# Patient Record
Sex: MALE | Employment: OTHER | URBAN - METROPOLITAN AREA
[De-identification: names, ages, dates, MRNs, and addresses within clinical notes are randomized per-mention and may not be internally consistent; named-entity substitution may affect disease eponyms.]

---

## 2022-05-03 ENCOUNTER — OFFICE VISIT (OUTPATIENT)
Dept: PHYSICAL MEDICINE AND REHAB | Facility: CLINIC | Age: 72
End: 2022-05-03
Payer: MEDICARE

## 2022-05-03 ENCOUNTER — LAB (OUTPATIENT)
Dept: LAB | Facility: HOSPITAL | Age: 72
End: 2022-05-03
Payer: MEDICARE

## 2022-05-03 VITALS
HEART RATE: 69 BPM | SYSTOLIC BLOOD PRESSURE: 148 MMHG | OXYGEN SATURATION: 97 % | BODY MASS INDEX: 26.5 KG/M2 | HEIGHT: 62 IN | TEMPERATURE: 98.2 F | DIASTOLIC BLOOD PRESSURE: 75 MMHG | WEIGHT: 144 LBS

## 2022-05-03 DIAGNOSIS — R53.82 CHRONIC FATIGUE: ICD-10-CM

## 2022-05-03 DIAGNOSIS — M54.6 PAIN IN THORACIC SPINE: Primary | ICD-10-CM

## 2022-05-03 DIAGNOSIS — M54.2 NECK PAIN: ICD-10-CM

## 2022-05-03 LAB
ALBUMIN SERPL-MCNC: 3.9 G/DL (ref 3.5–5)
ALP SERPL-CCNC: 79 U/L (ref 45–120)
ALT SERPL W P-5'-P-CCNC: 18 U/L (ref 0–45)
ANION GAP SERPL CALCULATED.3IONS-SCNC: 5 MMOL/L (ref 5–18)
AST SERPL W P-5'-P-CCNC: 20 U/L (ref 0–40)
BASOPHILS # BLD AUTO: 0 10E3/UL (ref 0–0.2)
BASOPHILS NFR BLD AUTO: 1 %
BILIRUB SERPL-MCNC: 0.5 MG/DL (ref 0–1)
BUN SERPL-MCNC: 11 MG/DL (ref 8–28)
CALCIUM SERPL-MCNC: 9.4 MG/DL (ref 8.5–10.5)
CHLORIDE BLD-SCNC: 108 MMOL/L (ref 98–107)
CO2 SERPL-SCNC: 28 MMOL/L (ref 22–31)
CREAT SERPL-MCNC: 0.98 MG/DL (ref 0.7–1.3)
EOSINOPHIL # BLD AUTO: 0.2 10E3/UL (ref 0–0.7)
EOSINOPHIL NFR BLD AUTO: 2 %
ERYTHROCYTE [DISTWIDTH] IN BLOOD BY AUTOMATED COUNT: 12.5 % (ref 10–15)
GFR SERPL CREATININE-BSD FRML MDRD: 82 ML/MIN/1.73M2
GLUCOSE BLD-MCNC: 99 MG/DL (ref 70–125)
HCT VFR BLD AUTO: 41 % (ref 40–53)
HGB BLD-MCNC: 13.4 G/DL (ref 13.3–17.7)
IMM GRANULOCYTES # BLD: 0 10E3/UL
IMM GRANULOCYTES NFR BLD: 0 %
LYMPHOCYTES # BLD AUTO: 1.7 10E3/UL (ref 0.8–5.3)
LYMPHOCYTES NFR BLD AUTO: 21 %
MCH RBC QN AUTO: 28.8 PG (ref 26.5–33)
MCHC RBC AUTO-ENTMCNC: 32.7 G/DL (ref 31.5–36.5)
MCV RBC AUTO: 88 FL (ref 78–100)
MONOCYTES # BLD AUTO: 0.6 10E3/UL (ref 0–1.3)
MONOCYTES NFR BLD AUTO: 7 %
NEUTROPHILS # BLD AUTO: 5.7 10E3/UL (ref 1.6–8.3)
NEUTROPHILS NFR BLD AUTO: 69 %
NRBC # BLD AUTO: 0 10E3/UL
NRBC BLD AUTO-RTO: 0 /100
PLATELET # BLD AUTO: 234 10E3/UL (ref 150–450)
POTASSIUM BLD-SCNC: 3.9 MMOL/L (ref 3.5–5)
PROT SERPL-MCNC: 7 G/DL (ref 6–8)
RBC # BLD AUTO: 4.65 10E6/UL (ref 4.4–5.9)
SODIUM SERPL-SCNC: 141 MMOL/L (ref 136–145)
WBC # BLD AUTO: 8.2 10E3/UL (ref 4–11)

## 2022-05-03 PROCEDURE — 80053 COMPREHEN METABOLIC PANEL: CPT

## 2022-05-03 PROCEDURE — 36415 COLL VENOUS BLD VENIPUNCTURE: CPT

## 2022-05-03 PROCEDURE — 99204 OFFICE O/P NEW MOD 45 MIN: CPT | Performed by: PHYSICAL MEDICINE & REHABILITATION

## 2022-05-03 PROCEDURE — 85025 COMPLETE CBC W/AUTO DIFF WBC: CPT

## 2022-05-03 RX ORDER — ALFUZOSIN HYDROCHLORIDE 10 MG/1
10 TABLET, EXTENDED RELEASE ORAL DAILY
COMMUNITY
Start: 2022-03-24 | End: 2023-03-24

## 2022-05-03 RX ORDER — GABAPENTIN 300 MG/1
1 CAPSULE ORAL AT BEDTIME
COMMUNITY
Start: 2021-10-04

## 2022-05-03 RX ORDER — POTASSIUM CHLORIDE 1500 MG/1
20 TABLET, EXTENDED RELEASE ORAL 2 TIMES DAILY
COMMUNITY
Start: 2021-10-04

## 2022-05-03 RX ORDER — LORAZEPAM 1 MG/1
1 TABLET ORAL 2 TIMES DAILY PRN
COMMUNITY
Start: 2021-10-04 | End: 2023-04-06

## 2022-05-03 RX ORDER — MECLIZINE HYDROCHLORIDE 25 MG/1
25 TABLET ORAL 2 TIMES DAILY PRN
COMMUNITY
Start: 2021-10-04

## 2022-05-03 RX ORDER — FAMOTIDINE 20 MG/1
20 TABLET, FILM COATED ORAL 2 TIMES DAILY
COMMUNITY

## 2022-05-03 RX ORDER — WARFARIN SODIUM 5 MG/1
5 TABLET ORAL DAILY
COMMUNITY
End: 2024-03-06

## 2022-05-03 RX ORDER — ACETAMINOPHEN 500 MG
1000 TABLET ORAL AT BEDTIME
COMMUNITY

## 2022-05-03 RX ORDER — TOPIRAMATE 25 MG/1
25 TABLET, FILM COATED ORAL 2 TIMES DAILY
COMMUNITY
Start: 2021-09-10

## 2022-05-03 RX ORDER — HYDROCHLOROTHIAZIDE 25 MG/1
25 TABLET ORAL DAILY PRN
COMMUNITY
Start: 2021-10-04

## 2022-05-03 RX ORDER — TRIAMTERENE AND HYDROCHLOROTHIAZIDE 75; 50 MG/1; MG/1
0.5 TABLET ORAL
COMMUNITY
Start: 2021-10-04

## 2022-05-03 RX ORDER — ATORVASTATIN CALCIUM 40 MG/1
40 TABLET, FILM COATED ORAL DAILY
COMMUNITY
Start: 2021-10-04

## 2022-05-03 ASSESSMENT — PAIN SCALES - GENERAL: PAINLEVEL: MILD PAIN (3)

## 2022-05-03 NOTE — PROGRESS NOTES
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  The thoracic spine pain does radiate somewhat laterally, but there is no caudal radiation.  Patient is also complaining of bilateral neck pain without any cervical radicular symptoms.  He does have constellation of concerning symptoms including a 15 to 20 pound unintentional weight loss over the last 6 months as well as excessive tiredness/fatigue.  He does have brisk reflexes on exam today but is otherwise without any focal neurologic deficit.  He does have dysphagia and imbalance.  Is difficult to tell if this could be related to the spine or if this could be related to GERD and his Ménière's disease.     He does have chronic bilateral low back pain with sciatica, but he states that this was stable and declined to have this evaluated today, stating that he was mainly there for the neck and the thoracic spine pain.    PSP:  Dr. Yañez    NDI:  22 %      PLAN:  A shared decision making model was used.  The patient's values and choices were respected.  The following represents what was discussed and decided upon by the physician and the patient.  He is accompanied by his daughter, Sahra garcia, for the entire encounter today.      1.  DIAGNOSTIC TESTS/RESULTS REVIEW:    -I did order an MRI of the cervical spine for further work-up today regarding the neck pain and the brisk reflexes as well as a dysphagia and imbalance..  -I did order an MRI of the thoracic spine for further work-up today regarding the thoracic spine pain as well as the unintentional weight loss.  -Both MRIs were ordered for [facility] as the patient does require an open sided MRI.  -I did  order a CBC and a CMP today.  For further work-up regarding his fatigue.  -His CBC from October 4, 2021 results were reviewed and are summarized as follows:  This was essentially normal.  Hemoglobin of 13.5, platelets of 233  -His CMP from October 4, 2021 results were reviewed and are summarized as follows: Very slight elevations of sodium, chloride, creatinine (146, 111, 1.27 respectively) with slightly diminished GFR at 56.  2.  PHYSICAL THERAPY: He may benefit from physical therapy but I would like to see the results of his MRIs first before considering physical therapy.  3.  MEDICATIONS:    -I did recommend that he try taking Tylenol 1000 mg in the morning and 1000 mg in the afternoon in addition to the 1000 mg at bedtime.  -I would recommend holding on a muscle relaxant medication at this time given his significant fatigue and the high side effect of fatigue/tiredness with taking a muscle relaxant medication.  He and his daughter are in agreement.  -The patient takes Coumadin/warfarin so NSAIDs are relatively contraindicated.  4.  INTERVENTIONS: Injections were not discussed today.  He needs further work-up before considering spine interventions.  5.  PATIENT EDUCATION:   -The patient did not have any questions today other than stated that he would like to know what is causing his pain.  I explained that the MRIs will hopefully show us what is causing his pain or at least rule out any concerning pathology as a cause of his pain.  -He and his daughter were in agreement with moving forward with the MRIs and the blood work.  6.  FOLLOW-UP:   I would like to have him follow-up with me after the MRIs to review the results and discuss other treatment options.  If there are any questions/concerns or any significant worsening of pain prior to that time, the patient is asked to call the clinic via the nurse navigation line or via [employer].       SUBJECTIVE:  Bob Norton  Is a 71 year old male who presents today  for new patient evaluation of thoracic spine pain and neck pain, with the thoracic spine pain being the main concern.  This pain has been going on for couple of years, but he does feel like its been getting worse.  He describes the pain is very sharp and states that at times he is unable to take a deep breath and feels like he has trouble breathing because of the pain.  This pain is located right in the middle of the low back.  It can radiate to the right side, but occasionally to the left.  He denies any radiation caudally.  He says that there was one episode last week where he was awakened to a slight stabbing pain that seem to radiate anteriorly.  He said it almost seem to correlate with his heartbeat.  He does have a history of sciatica, but he states that this is not something that is overly concerning to him.  He would like to focus on the thoracic spine pain.  He says that this pain seems to be brought on by working on the computer or preparing meals or medications.  He says he feels like he gets twisted.  At times he feels like there is a weight pushing down on him.  He does feel better if he can sit in a certain chair that has a footstool.  He says that within minutes it can get significantly better.  He does not typically take any pain medications.  At this point he is just taking gabapentin 300 mg at bedtime and Tylenol 1000 mg at bedtime but this is mainly to manage chronic pain from an ulnar neuropathy.  He has not done any physical therapy or had any injections for this pain.    With regards to the neck pain, he points to the base of his neck on either side.  He reports that he has had this for over 10 years.  It does not radiate down to the arms.  She denies any numbness tingling or weakness in the arms.  He says that this pain tends to be worse with driving, especially if there is heavy traffic as it makes him more tense and this seems to aggravate the pain.  He reports that he has tried resting the  area and that this seems to have the pain go away after period of time.  He has seen a chiropractor for several years and it did help temporarily, but he has not seen a chiropractor in over a year.  He has never done physical therapy or had any injections for the neck pain.  He has no history of neck or mid back surgeries.  He did have surgery for his low back back in 1974.  His main concern is finding out what is causing these areas of pain.    Medications:  Reviewed and correct in the chart.      Allergies: Reviewed and Bactrim DS causes a rash, lisinopril causes a cough, metronidazole causes a rash, sulfa causes a rash, venlafaxine causes CNS side effects, Vicodin makes him nauseous.  Anesthesia takes him a long time to awaken from.    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  He did not mention it during the encounter, but his medical condition lists indicate that he has had surgery on his knees.    Family History:  Reviewed and significant for breast cancer in his mother, melanoma and prostate cancer in his father, heart disease in both parents, hypertension in both parents, stroke in his mother.    Social History: The patient is a retired .  He is currently been caring for his ex-wife who has Alzheimer's disease, but his daughter states that she will be moving to a memory care center next week in the [place].  He drinks alcohol about once per month.  She denies any tobacco or illicit drug use    ROS: Positive for 15 pound weight loss since October.  Chronic sexual dysfunction, chronic hoarseness, chronic tinnitus, chronic dysphagia (related to  phlegm), cough related to phlegm, shortness of breath related to pain, reflux, difficulty urinating (related to his prostate), right hand pain secondary to ulnar mononeuropathy, sciatica (this is manageable), imbalance (related to Ménière's disease), excessive tiredness, depression.   Specifically negative for fevers,chills, appetite changes. Otherwise 13 systems reviewed are negative.  Please see the patient's intake questionnaire from today for details.      OBJECTIVE:  PHYSICAL EXAMINATION:  CONSTITUTIONAL:  Vital signs as above.  No acute distress.  The patient is well nourished and well groomed.  PSYCHIATRIC:  The patient is awake, alert, oriented to person, place, time and answering questions appropriately with clear speech.    HEENT:  Sclera are non-injected.  Extraocular muscles are grossly intact. Anterior neck is supple.  Thyroid without obvious mass or obvious irregularity.    SKIN:  Skin over the face, bilateral upper extremities, and posterior torso is clean, dry, intact without rashes.  LYMPH NODES:  No palpable or tender anterior/posterior cervical, submandibular, or supraclavicular lymph nodes.    MUSCLE STRENGTH:  5/5 strength for the bilateral shoulder abductors, elbow flexors/extensors, wrist extensors, finger flexors/abductors.  NEURO:    2+/4 symmetric biceps, brachioradialis, triceps reflexes bilaterally.  2+/4 patellar, medial hamstring, Achilles reflexes bilaterally.  No ankle clonus bilaterally.  Sensation to light touch is impaired in the right ring and pinky fingers compared to these digits on the left hand.  Otherwise sensation light touch is intact in all of the other digits of both upper extremities.  Negative Guerrero's bilaterally.    VASCULAR:  2/4 radial pulses bilaterally.  Warm upper limbs bilaterally.  Capillary refill in the upper extremities is less than 1 second.  MUSCULOSKELETAL: He has normal cervical spine flexion range of motion.  He does report some mild pain with cervical  flexion.  He has significantly restricted extension range of motion of the cervical spine.  He reports no pain though with extension, stating that this actually helps to relieve pain.  He does have severely restricted cervical sidebending bilaterally, but more restricted to the left side compared to the right.  He does not have any pain with bilateral cervical sidebending, but with returning to a neutral position from the right side bending position, he does report pain.  Not as much pain with returning to neutral from the left side bending position.  I does have her severely restricted cervical rotation bilaterally.  No significant pain with this motion.  He does have largely normal range of motion of the shoulders with flexion, extension, abduction.  He does not have any pain with flexion or extension of the shoulders.  With abduction of the shoulders though, he does get some pain along the left side of his neck.  No significant pain with internal or external rotation of the shoulders.  He does have hypertonicity over the bilateral upper trapezius muscles.  There is palpable trigger point in the right upper trapezius that does cause radiation of pain going down his right hand with numbness and tingling in the right hand.  He does not have any significant tenderness over the bilateral cervical paraspinal muscles.  He does report some mild tenderness over the right greater than left thoracic paraspinal muscles at approximately the T8-T10 level.  There is some slight asymmetry over the right thoracolumbar paraspinal muscles and the angles of the right ribs at these levels compared to the left side.  Over the thoracic spine spinous processes, there does appear to be less prominence of the approximately T10-T12 spinous processes compared to the other thoracic and lumbar spinous processes.

## 2022-05-03 NOTE — PATIENT INSTRUCTIONS
Bob,    It was so nice to meet you!  I am ordering an MRI of your cervical spine (neck) and your thoracic spine (mid back).  I have ordered this is an open sided MRI through [facility].  To schedule with [facility] (formerly [facility] and [facility]), please call [phone].    Please follow-up with me (Dr. Yañez) after your MRI to review the results and discuss your treatment options.  Please call [phone] to schedule this appointment.  Please schedule this appointment for at least 3 days after your MRI to allow time for the radiologist to finalize your MRI report.      I did order blood work.  You can have this drawn at [facility] today if you have time to go there after this appointment.  If you prefer to have this done at your primary care physician's office, please call that office to make a blood draw appointment.    You can take Tylenol 1000 mg in the morning and 1000 mg in the afternoon in addition to 1000 mg at bedtime to try and help manage her pain until we get the results of the MRI.    If you have any questions or concerns prior to your appointment, please call the nurse line at [phone].  Or Sahra can reach out to me as well.    Thank you!  Dr. Yañez

## 2022-05-11 ENCOUNTER — ANCILLARY PROCEDURE (OUTPATIENT)
Dept: RADIOLOGY | Facility: CLINIC | Age: 72
End: 2022-05-11
Payer: MEDICARE

## 2022-05-11 ENCOUNTER — TRANSFERRED RECORDS (OUTPATIENT)
Dept: HEALTH INFORMATION MANAGEMENT | Facility: CLINIC | Age: 72
End: 2022-05-11

## 2022-06-02 ENCOUNTER — TELEPHONE (OUTPATIENT)
Dept: PHYSICAL MEDICINE AND REHAB | Facility: CLINIC | Age: 72
End: 2022-06-02
Payer: MEDICARE

## 2022-06-02 NOTE — TELEPHONE ENCOUNTER
PSP:  Dr. Yañez   Last clinic visit: 5/3/22 new consult  Reason for call: Daughter Kimberley calling to discuss imaging  Clinical information:  States patient received a call about whether he was going to schedule his MRIs or not. States he had them done at [facility] 2-3 weeks ago. Per chart review, results are loaded in PACS.   Also states call received about insurance and a bill. Transferred to billing regarding this.   Advice given to patient: Explained PSP would be notified imaging has been completed.     Provider to address: FYI, patient had imaging done 5/11 at [facility]. Images in PACS    Call back to daughter to inform patient should make a follow up appointment for review of imaging and discuss treatment plan. Left detailed message on dedicated voicemail.

## 2022-06-14 NOTE — PROGRESS NOTES
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  He was having concerning symptoms of unintentional weight loss, fatigue, dysphagia, imbalance.  His MRI of the cervical spine does show mild central canal stenosis but no abnormality of the spinal cord.  There are no other findings, such as cancer, that relate to the 15 to 20 pound unintentional weight loss.      He does have a new complaint today of acute right-sided low back pain, consistent with sacroiliac joint pain that has just been bothering him in the interim since he was last seen.    PSP:  Dr. Yañez      PLAN:  A shared decision making model was used.  The patient's values and choices were respected.  The following represents what was discussed and decided upon by the physician and the patient.  His daughter, Sahra garcia, is present for the entire encounter.      1.  DIAGNOSTIC TESTS:    -The patient's comprehensive metabolic panel from May 3, 2022 was reviewed by the physician and is summarized as follows: Slightly elevated chloride at 108 (upper limit of normal is 107) but otherwise this is normal.  -The patient CBC from May 3, 2022 was reviewed by the physician and the results are summarized as follows: This is completely normal.  -The patient's MRI of the cervical spine from May 11, 2022 performed through [facility] was personally reviewed today by the physician with physician performing her own interpretation.  This does show mild C2-3 facet degeneration with more moderate C3-4 facet degeneration.  There is mild central canal stenosis at the C3-4 level with right greater than left severe C3-4 foraminal stenosis.  There is right greater than  left C4-5 facet arthropathy with mild central canal stenosis and mild bilateral C4-5 foraminal stenosis.  There is a right paracentral disc protrusion at the C5-6 level, but no significant central canal stenosis.  There is mild C5-6 facet arthropathy.  There is mild C6-7 facet arthropathy with mild left C6-7 foraminal stenosis.  There is severe C7-T1 foraminal stenosis but no significant central canal stenosis.  Mild C7-T1 facet arthropathy.  -The patient's MRI of the thoracic spine from May 11, 2022 performed through [facility] report was reviewed today by the physician and is summarized as follows: Normal thoracic spinal cord.  No abnormal spinal cord signal change.  There is disc degeneration seen, but no significant central canal stenosis or foraminal stenosis.  There is mild facet arthropathy seen throughout the thoracic spine.  -The image findings from the MRI of the cervical spine and thoracic spine were discussed with the patient.  2.  PHYSICAL THERAPY:   At this point I did offer him physical therapy, as I do not find any contraindication to physical therapy at this time.  He would like to consider this, as he is already supposed to undergo physical therapy for the Parkinson's.  He needs to look at scheduling, and would like to discuss this with his family.  I will check in with him in 1 week to see if he has made a decision about physical therapy.  If he needs more time, I can check in with him in the following week.  - If he does want physical therapy, he prefers to go to [place] (he can go to whichever location he prefers but should check that it is covered by insurance).  The following order can be used:  Diagnosis:  Axial neck pain and midthoracic spine pain likely from myofascial pain or facet etiology.  Acute right low back pain consistent with SI joint pain.   Language:  English  Contraindications:  Patient being worked up for Parkinson's disease - falls precautions  Please teach HEP for core  and neck (glut/hamstring/abdominal/back, midback) strengthening, stretching, ROM.  Can trial cervical traction.  May use modalities as needed but not exceeding 10% of the total treatment time.  Gentle manual medicine/joint mobilization as needed.  Visits:  4-6 times total.      3.  MEDICATIONS:    -Given his fatigue, I am again hesitant to start any sort of prescription medication such as a muscle relaxant or gabapentin, as these could make his fatigue worse.  At this time he states he does not feel that he needs medications because he can sit down to relieve his pain, and seems to be managing well with Tylenol.  His daughter reports that even now he takes the Tylenol very infrequently.  -He can continue to take Tylenol as needed for pain.  -He is taking Coumadin/warfarin so NSAIDs are relatively contraindicated.  4.  INTERVENTIONS:    -Injections were not discussed today as they are typically not indicated for thoracic spine pain.  I think that he would benefit more from the physical therapy first, but if pain does not significantly improve with physical therapy we could look at injections for the neck.  For the neck pain I would potentially start with medial branch blocks at the C5, C6, C7 level.  5.  PATIENT EDUCATION:   -I did discuss that I do not have a good answer for him with regards to the cause of the fatigue, weight loss, dysphagia, imbalance.  I did recommend that he follow-up with his primary care physician.   -He has been referred to neurology for concern regarding Parkinson's disease.  [facility] cannot get him into neurology until January.  I did offer to have him be seen through [facility].  He is amenable to this as long as it is covered by insurance.  It sounds like his daughter will look into this and she did request the referral to [facility].  I will place the referral and will check with neurology manager to see if we can get him in sooner.  6.  FOLLOW-UP:   I will check in  with him through [facility] [facility] in 1 week to see if he has made a decision about physical therapy.  If he decides to move forward with physical therapy, we can check back in with him in 4 to 6 weeks either by appointment or through [facility] [facility].  If he has not made a decision I can follow-up with him the following week.  If he decides that he would like to focus just on the physical therapy for the Parkinson's, I can check in with him in another 2 to 3 weeks through [facility] [facility] to assess his status at that time.  If there are any questions/concerns or any significant worsening of pain prior to that time, the patient is asked to call the clinic via the nurse navigation line or via [employer].     A total of 48 minutes was spent in the care of this patient on the date of service.      Subjective:     Bob Norton  (AKA Pramod) is a 72 year old male who presents today for follow-up regarding neck pain and thoracic spine pain.  Since his initial evaluation, he reports that he is doing somewhat better, but he has been able to avoid a lot of the activities that previously triggered the pain.  His wife has been placed in a living facility, and so he is not doing as much work to care for her and his daughter thinks that this may be some of what is helping with the pain.  He reports though that he does still have the pain if he does any sort of work when he is standing.  He reports that the pain that he does have is just as bad as it was at the initial evaluation.  He just does not experience it as often.  The pain is located at the base of the neck and then he has pain on the right side of his thoracic spine just below the shoulder blade.  He does have some numbness and tingling in the right arm and hand related to previous ulnar neuropathy.  He does feel like he has generalized weakness.  He is rating his pain today at a 3 out of 10.  At worst it is a 7 out of 10.  At best is a 0 out of 10.  His pain is worse with working in a standing  position.  He does feel better almost immediately when he can sit down in a comfortable chair.  Because he can alleviate the symptoms with sitting down, he has not really needed to take the Tylenol very often.  He does feel like he manages fine with Tylenol alone.    He is complaining of a new complaint of right low back pain at the belt line.  It does not radiate down the leg.  It sounds like this has started since he was seen at his initial consultation.  It tends to bother him when he is out doing activities.    He is interested to know the results of his MRIs.    He states that his primary care physician is working him up for Parkinson's disease and he does have a referral to neurology but they cannot see him until January.  He is supposed to do a physical therapy assessment for Parkinson's.    He does state that the dysphagia that he is having has been improved with taking Flonase and using a salt gargle.    He is unsure if he continues to lose weight, as he has not checked his weight recently.  He reports that he is not eating as much, but his appetite seems to be okay.  He reports that he is eating 2 full meals a day and trying to eat 1 partial meal a day in addition to the 2 full meals.    Past medical history is reviewed and is unchanged for any new medical diagnoses in the interim.      The patient clarifies that even though his chart may have listed that he has had previous knee surgeries, he has never had knee surgeries.    Family history is reviewed and is unchanged in the interim.        Review of Systems:  Positive for numbness, tingling, weakness, difficulty with hand skills, unintentional weight loss.  Pertinent negatives include no fevers, chills, bowel incontinence, bladder incontinence, trips, stumbles, falls.  All others reviewed and are negative.     Objective:   CONSTITUTIONAL:  Vital signs as above.  No acute distress.  The patient is well nourished and well groomed.    PSYCHIATRIC:  The  patient is awake, alert, oriented to person, place and time.  The patient is answering questions appropriately with clear speech.  Normal affect.  SKIN:  Skin over the face, neck bilateral upper extremities, posterior torso is clean, dry, intact without rashes.  There are no scars (surgical) over either knee)  MUSCULOSKELETAL: The patient has 5/5 strength for the bilateral shoulder abductors, elbow flexors/extensors, wrist extensors, finger flexors/abductors.  He has 5/5 strength of bilateral hip flexors, knee flexors/extensors, ankle dorsiflexors/plantar flexors, ankle evertors/invertors.  He does have tenderness over the T6 and T7 paraspinal muscles bilaterally.  No tenderness over the more cephalad or caudal thoracic paraspinal muscles.  He points to pain over the right SI joint but there is no significant reproducible tenderness palpation over this area.  No tenderness over the more cephalad lumbar paraspinal muscles.  He has some mild pain over the bilateral cervical paraspinal muscles, at the level of C5-7.  There is mild hypertonicity of the bilateral upper trapezius muscles.  NEUROLOGICAL:  2/4  Biceps, brachioradialis, triceps reflexes bilaterally.  2/4 patellar, medial hamstring, Achilles reflexes bilaterally negative Guerrero's bilaterally.  Sensation to light touch is intact in the bilateral L4, 5, and S1 dermatomes.  No ankle clonus bilaterally.  Sensation light touch is mildly impaired in the right ring and pinky fingers compared to the left ring and pinky fingers.  Sensation to light touch otherwise appears to be intact in the thumb, index finger, middle fingers.

## 2022-06-15 ENCOUNTER — OFFICE VISIT (OUTPATIENT)
Dept: PHYSICAL MEDICINE AND REHAB | Facility: CLINIC | Age: 72
End: 2022-06-15
Payer: MEDICARE

## 2022-06-15 DIAGNOSIS — R53.83 FATIGUE, UNSPECIFIED TYPE: ICD-10-CM

## 2022-06-15 DIAGNOSIS — M54.6 PAIN IN THORACIC SPINE: ICD-10-CM

## 2022-06-15 DIAGNOSIS — M54.2 NECK PAIN: Primary | ICD-10-CM

## 2022-06-15 PROCEDURE — 99215 OFFICE O/P EST HI 40 MIN: CPT | Performed by: PHYSICAL MEDICINE & REHABILITATION

## 2022-06-15 NOTE — LETTER
6/15/2022         RE: [place] [address]        Dear Colleague,    Thank you for referring your patient, Bob Norton, to the [facility]. Please see a copy of my visit note below.    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  He was having concerning symptoms of unintentional weight loss, fatigue, dysphagia, imbalance.  His MRI of the cervical spine does show mild central canal stenosis but no abnormality of the spinal cord.  There are no other findings, such as cancer, that relate to the 15 to 20 pound unintentional weight loss.      He does have a new complaint today of acute right-sided low back pain, consistent with sacroiliac joint pain that has just been bothering him in the interim since he was last seen.    PSP:  Dr. Yañez      PLAN:  A shared decision making model was used.  The patient's values and choices were respected.  The following represents what was discussed and decided upon by the physician and the patient.  His daughter, Sahra garcia, is present for the entire encounter.      1.  DIAGNOSTIC TESTS:    -The patient's comprehensive metabolic panel from May 3, 2022 was reviewed by the physician and is summarized as follows: Slightly elevated chloride at 108 (upper limit of normal is 107) but otherwise this is normal.  -The patient CBC from May 3, 2022 was reviewed by the physician and the results are summarized as follows: This is completely normal.  -The patient's MRI of the cervical spine from May 11, 2022 performed through [facility] was personally reviewed today by the physician with physician performing her  own interpretation.  This does show mild C2-3 facet degeneration with more moderate C3-4 facet degeneration.  There is mild central canal stenosis at the C3-4 level with right greater than left severe C3-4 foraminal stenosis.  There is right greater than left C4-5 facet arthropathy with mild central canal stenosis and mild bilateral C4-5 foraminal stenosis.  There is a right paracentral disc protrusion at the C5-6 level, but no significant central canal stenosis.  There is mild C5-6 facet arthropathy.  There is mild C6-7 facet arthropathy with mild left C6-7 foraminal stenosis.  There is severe C7-T1 foraminal stenosis but no significant central canal stenosis.  Mild C7-T1 facet arthropathy.  -The patient's MRI of the thoracic spine from May 11, 2022 performed through [facility] report was reviewed today by the physician and is summarized as follows: Normal thoracic spinal cord.  No abnormal spinal cord signal change.  There is disc degeneration seen, but no significant central canal stenosis or foraminal stenosis.  There is mild facet arthropathy seen throughout the thoracic spine.  -The image findings from the MRI of the cervical spine and thoracic spine were discussed with the patient.  2.  PHYSICAL THERAPY:   At this point I did offer him physical therapy, as I do not find any contraindication to physical therapy at this time.  He would like to consider this, as he is already supposed to undergo physical therapy for the Parkinson's.  He needs to look at scheduling, and would like to discuss this with his family.  I will check in with him in 1 week to see if he has made a decision about physical therapy.  If he needs more time, I can check in with him in the following week.  - If he does want physical therapy, he prefers to go to [place] (he can go to whichever location he prefers but should check that it is covered by insurance).  The following order can be used:  Diagnosis:  Axial neck pain and  midthoracic spine pain likely from myofascial pain or facet etiology.  Acute right low back pain consistent with SI joint pain.   Language:  English  Contraindications:  Patient being worked up for Parkinson's disease - falls precautions  Please teach HEP for core and neck (glut/hamstring/abdominal/back, midback) strengthening, stretching, ROM.  Can trial cervical traction.  May use modalities as needed but not exceeding 10% of the total treatment time.  Gentle manual medicine/joint mobilization as needed.  Visits:  4-6 times total.      3.  MEDICATIONS:    -Given his fatigue, I am again hesitant to start any sort of prescription medication such as a muscle relaxant or gabapentin, as these could make his fatigue worse.  At this time he states he does not feel that he needs medications because he can sit down to relieve his pain, and seems to be managing well with Tylenol.  His daughter reports that even now he takes the Tylenol very infrequently.  -He can continue to take Tylenol as needed for pain.  -He is taking Coumadin/warfarin so NSAIDs are relatively contraindicated.  4.  INTERVENTIONS:    -Injections were not discussed today as they are typically not indicated for thoracic spine pain.  I think that he would benefit more from the physical therapy first, but if pain does not significantly improve with physical therapy we could look at injections for the neck.  For the neck pain I would potentially start with medial branch blocks at the C5, C6, C7 level.  5.  PATIENT EDUCATION:   -I did discuss that I do not have a good answer for him with regards to the cause of the fatigue, weight loss, dysphagia, imbalance.  I did recommend that he follow-up with his primary care physician.   -He has been referred to neurology for concern regarding Parkinson's disease.  [facility] cannot get him into neurology until January.  I did offer to have him be seen through [facility].  He is amenable to this as long as it  is covered by insurance.  It sounds like his daughter will look into this and she did request the referral to [facility].  I will place the referral and will check with neurology manager to see if we can get him in sooner.  6.  FOLLOW-UP:   I will check in with him through [employer] in 1 week to see if he has made a decision about physical therapy.  If he decides to move forward with physical therapy, we can check back in with him in 4 to 6 weeks either by appointment or through [facility] [facility].  If he has not made a decision I can follow-up with him the following week.  If he decides that he would like to focus just on the physical therapy for the Parkinson's, I can check in with him in another 2 to 3 weeks through [employer] to assess his status at that time.  If there are any questions/concerns or any significant worsening of pain prior to that time, the patient is asked to call the clinic via the nurse navigation line or via [employer].     A total of 48 minutes was spent in the care of this patient on the date of service.      Subjective:     Bob Norton  (AKA Pramod) is a 72 year old male who presents today for follow-up regarding neck pain and thoracic spine pain.  Since his initial evaluation, he reports that he is doing somewhat better, but he has been able to avoid a lot of the activities that previously triggered the pain.  His wife has been placed in a living facility, and so he is not doing as much work to care for her and his daughter thinks that this may be some of what is helping with the pain.  He reports though that he does still have the pain if he does any sort of work when he is standing.  He reports that the pain that he does have is just as bad as it was at the initial evaluation.  He just does not experience it as often.  The pain is located at the base of the neck and then he has pain on the right side of his thoracic spine just below the shoulder blade.  He does have some numbness and tingling  in the right arm and hand related to previous ulnar neuropathy.  He does feel like he has generalized weakness.  He is rating his pain today at a 3 out of 10.  At worst it is a 7 out of 10.  At best is a 0 out of 10.  His pain is worse with working in a standing position.  He does feel better almost immediately when he can sit down in a comfortable chair.  Because he can alleviate the symptoms with sitting down, he has not really needed to take the Tylenol very often.  He does feel like he manages fine with Tylenol alone.    He is complaining of a new complaint of right low back pain at the belt line.  It does not radiate down the leg.  It sounds like this has started since he was seen at his initial consultation.  It tends to bother him when he is out doing activities.    He is interested to know the results of his MRIs.    He states that his primary care physician is working him up for Parkinson's disease and he does have a referral to neurology but they cannot see him until January.  He is supposed to do a physical therapy assessment for Parkinson's.    He does state that the dysphagia that he is having has been improved with taking Flonase and using a salt gargle.    He is unsure if he continues to lose weight, as he has not checked his weight recently.  He reports that he is not eating as much, but his appetite seems to be okay.  He reports that he is eating 2 full meals a day and trying to eat 1 partial meal a day in addition to the 2 full meals.    Past medical history is reviewed and is unchanged for any new medical diagnoses in the interim.      The patient clarifies that even though his chart may have listed that he has had previous knee surgeries, he has never had knee surgeries.    Family history is reviewed and is unchanged in the interim.        Review of Systems:  Positive for numbness, tingling, weakness, difficulty with hand skills, unintentional weight loss.  Pertinent negatives include no fevers,  chills, bowel incontinence, bladder incontinence, trips, stumbles, falls.  All others reviewed and are negative.     Objective:   CONSTITUTIONAL:  Vital signs as above.  No acute distress.  The patient is well nourished and well groomed.    PSYCHIATRIC:  The patient is awake, alert, oriented to person, place and time.  The patient is answering questions appropriately with clear speech.  Normal affect.  SKIN:  Skin over the face, neck bilateral upper extremities, posterior torso is clean, dry, intact without rashes.  There are no scars (surgical) over either knee)  MUSCULOSKELETAL: The patient has 5/5 strength for the bilateral shoulder abductors, elbow flexors/extensors, wrist extensors, finger flexors/abductors.  He has 5/5 strength of bilateral hip flexors, knee flexors/extensors, ankle dorsiflexors/plantar flexors, ankle evertors/invertors.  He does have tenderness over the T6 and T7 paraspinal muscles bilaterally.  No tenderness over the more cephalad or caudal thoracic paraspinal muscles.  He points to pain over the right SI joint but there is no significant reproducible tenderness palpation over this area.  No tenderness over the more cephalad lumbar paraspinal muscles.  He has some mild pain over the bilateral cervical paraspinal muscles, at the level of C5-7.  There is mild hypertonicity of the bilateral upper trapezius muscles.  NEUROLOGICAL:  2/4  Biceps, brachioradialis, triceps reflexes bilaterally.  2/4 patellar, medial hamstring, Achilles reflexes bilaterally negative Guerrero's bilaterally.  Sensation to light touch is intact in the bilateral L4, 5, and S1 dermatomes.  No ankle clonus bilaterally.  Sensation light touch is mildly impaired in the right ring and pinky fingers compared to the left ring and pinky fingers.  Sensation to light touch otherwise appears to be intact in the thumb, index finger, middle fingers.              Again, thank you for allowing me to participate in the care of your  patient.        Sincerely,        Kimberley Covarrubias, DO

## 2022-06-15 NOTE — PATIENT INSTRUCTIONS
Gene,    The MRIs of your neck and your thoracic spine/mid back do not show any abnormalities of the spinal cord.  It does show some mild arthritis/degeneration changes.  I think it is safe for you to undergo physical therapy at this time to try and help with the pain that you are having.  I know that you are undergoing a physical therapy assessment and potentially another physical therapy program for Parkinson's.  If you would prefer to hold off on physical therapy for the neck and the mid back, we can hold off to allow you to focus on the physical therapy for the Parkinson's.  If you do want an order, I would be happy to provide an order.  Please discuss this with your family and I will check back with you in a week to see if you have made a decision, but please note there is no rush in this decision.    I am still concerned about the other symptoms that you are having (Fatigue and weight loss).  I think it would be good if you can follow-up with your primary care physician for some additional work-up.  Your hemoglobin/CBC was normal.  You may benefit from having your thyroid looked at.  The good news was that you did not appear to have diabetes when they last checked her labs in October.    I am going to put in a referral for neurology to see if you can be evaluated sooner than January for the potential Parkinson's.    Please let me know if you have any questions or concerns that, before I check in with you in 1 week.  You are welcome to contact me through [employer].  If you prefer to call the clinic, the nurses direct phone number is [phone].    Thank you, Gene!  Dr. Yañez

## 2022-06-21 ENCOUNTER — TELEPHONE (OUTPATIENT)
Dept: PHYSICAL MEDICINE AND REHAB | Facility: CLINIC | Age: 72
End: 2022-06-21
Payer: MEDICARE

## 2022-06-21 DIAGNOSIS — M53.3 SACROILIAC JOINT PAIN: ICD-10-CM

## 2022-06-21 DIAGNOSIS — M54.6 PAIN IN THORACIC SPINE: ICD-10-CM

## 2022-06-21 DIAGNOSIS — M54.2 NECK PAIN: Primary | ICD-10-CM

## 2022-06-21 NOTE — TELEPHONE ENCOUNTER
PSP:  Dr. Yañez  Last clinic visit:  6/15/2022  Reason for call: PT referral  Clinical information:  Call received from VALERIANO Abarca in [place]. She is currently seeing the pt for his Parkinsons but the patient would like to be seen for his back as well. She inquires about a referral.   Chart reviewed. Pt was offered PT at his last appointment and details of referral were included in OV note.   Referral placed and faxed to Rima at #[phone] as requested.   Advice given to patient: Pt scheduled for PT today so she will start with him today once fax received.   Provider to address: JEANMARIE

## 2022-07-24 ENCOUNTER — HEALTH MAINTENANCE LETTER (OUTPATIENT)
Age: 72
End: 2022-07-24

## 2022-10-03 ENCOUNTER — HEALTH MAINTENANCE LETTER (OUTPATIENT)
Age: 72
End: 2022-10-03

## 2022-10-26 ENCOUNTER — OFFICE VISIT (OUTPATIENT)
Dept: NEUROLOGY | Facility: CLINIC | Age: 72
End: 2022-10-26
Attending: PHYSICAL MEDICINE & REHABILITATION
Payer: MEDICARE

## 2022-10-26 VITALS — SYSTOLIC BLOOD PRESSURE: 103 MMHG | DIASTOLIC BLOOD PRESSURE: 64 MMHG | HEART RATE: 65 BPM

## 2022-10-26 DIAGNOSIS — G20.A1 PARKINSON DISEASE (H): Primary | ICD-10-CM

## 2022-10-26 DIAGNOSIS — R49.0 DYSPHONIA: ICD-10-CM

## 2022-10-26 DIAGNOSIS — R53.83 FATIGUE, UNSPECIFIED TYPE: ICD-10-CM

## 2022-10-26 DIAGNOSIS — K11.7 SIALORRHEA: ICD-10-CM

## 2022-10-26 PROCEDURE — 99205 OFFICE O/P NEW HI 60 MIN: CPT | Performed by: PSYCHIATRY & NEUROLOGY

## 2022-10-26 RX ORDER — CARBIDOPA AND LEVODOPA 25; 100 MG/1; MG/1
TABLET ORAL
Qty: 90 TABLET | Refills: 11 | Status: SHIPPED | OUTPATIENT
Start: 2022-10-26 | End: 2023-03-01

## 2022-10-26 ASSESSMENT — UNIFIED PARKINSONS DISEASE RATING SCALE (UPDRS)
GAIT: NORMAL
TOTAL_SCORE: 8
DYSKINESIAS_PRESENT: NO
HANDMOVEMENTS_RIGHT: SLIGHT: ANY OF THE FOLLOWING: A) THE REGULAR RHYTHM IS BROKEN WITH ONE WITH ONE OR TWO INTERRUPTIONS OR HESITATIONS OF THE MOVEMENT B) SLIGHT SLOWING C) THE AMPLITUDE DECREMENTS NEAR THE END OF THE 10 MOVEMENTS.
FINGER_TAPPING_LEFT: SLIGHT: ANY OF THE FOLLOWING: A) THE REGULAR RHYTHM IS BROKEN WITH ONE WITH ONE OR TWO INTERRUPTIONS OR HESITATIONS OF THE MOVEMENT B) SLIGHT SLOWING C) THE AMPLITUDE DECREMENTS NEAR THE END OF THE 10 MOVEMENTS.
RIGIDITY_NECK: MODERATE: RIGIDITY DETECTED WITHOUT THE ACTIVATION MANEUVER. FULL RANGE OF MOTION IS ACHIEVED WITH EFFORT.
RIGIDITY_LUE: MILD: RIGIDITY DETECTED WITHOUT THE ACTIVATION MANEUVER.  FULL RANGE OF MOTION IS EASILY ACHIEVED.
TOTAL_SCORE: 23
CONSTANCY_TREMOR_ATREST: NORMAL: NO TREMOR.
AMPLITUDE_RUE: SLIGHT: < 1 CM IN MAXIMAL AMPLITUDE.
TOETAPPING_LEFT: NORMAL
RIGIDITY_RUE: SLIGHT: RIGIDITY ONLY DETECTED WITH ACTIVATION MANEUVER.
SPONTANEITY_OF_MOVEMENT: 0: NORMAL.  NO PROBLEMS.
ARISING_CHAIR: NORMAL: ABLE TO ARISE QUICKLY WITHOUT HESITATION.
POSTURE: 1 SLIGHT.  NOT QUITE ERECT BUT COULD BE NORMAL FOR OLDER PERSONS.
PRONATION_SUPINATION_LEFT: MODERATE: ANY OF THE FOLLOWING:  A) MORE THAN 5 INTERRUPTIONS  OR AT LEAST ONE LONGER ARREST (FREEZE) IN ONGOING MOVEMENT  B) MODERATE SLOWING C) THE AMPLITUDE DECREMENTS STARTING AFTER THE FIRST MOVEMENT.
AMPLITUDE_LIP_JAW: NORMAL: NO TREMOR.
FACIAL_EXPRESSION: MODERATE: MASKED FACIES WITH LIPS PARTED SOME OF THE TIME WHEN THE MOUTH IS AT REST.
TOTAL_SCORE_LEFT: 7
AMPLITUDE_LLE: NORMAL: NO TREMOR.
SPEECH: SLIGHT: LOSS OF MODULATION, DICTION OR VOLUME, BUT STILL ALL WORDS EASY TO UNDERSTAND.
LEG_AGILITY_LEFT: NORMAL
RIGIDITY_LLE: NORMAL
HANDMOVEMENTS_LEFT: NORMAL
AMPLITUDE_LUE: NORMAL: NO TREMOR.
PARKINSONS_MEDS: OFF
POSTURAL_STABILITY: NORMAL:  RECOVERS WITH ONE OR TWO STEPS.
LEG_AGILITY_RIGHT: NORMAL
RIGIDITY_RLE: NORMAL
FINGER_TAPPING_RIGHT: MILD: ANY OF THE FOLLOWING: A) 3 TO 5 INTERRUPTIONS DURING TAPPING B) MILD SLOWING C) THE AMPLITUDE DECREMENTS MIDWAY IN THE 10-MOVEMENT SEQUENCE
AMPLITUDE_RLE: NORMAL: NO TREMOR.
AXIAL_SCORE: 8
PRONATION_SUPINATION_RIGHT: SLIGHT: ANY OF THE FOLLOWING: A) THE REGULAR RHYTHM IS BROKEN WITH ONE WITH ONE OR TWO INTERRUPTIONS OR HESITATIONS OF THE MOVEMENT B) SLIGHT SLOWING C) THE AMPLITUDE DECREMENTS NEAR THE END OF THE 10 MOVEMENTS.
TOETAPPING_RIGHT: NORMAL
FREEZING_GAIT: NORMAL

## 2022-10-26 NOTE — PATIENT INSTRUCTIONS
Plan:   - MRI brain images to be provided and scanned to chart  - Start Sinemet (carbidopa/levodopa) 25/100 mg IR as treatment for Parkinson's disease. Use the following schedule to start Sinemet:  Sinemet (CD/LD) 25/100 mg IR AM Noon PM   Week 1                         0.5 tab 0.5 tab 0.5 tab   Week 2             1 1 1    - Take Sinemet one hour before a meal time or one hour after a meal   - May stop at dose that improves symptoms. I would expect your symptoms of bradykinesia (slowness), neck stiffness, tremor, and, to some degree, postural instability or trouble walking  (shuffling). Freezing of gait may or may not respond to this medication.   - Common Side Effects discussed including: dizziness, nausea, constipation. If nausea or vomiting should occur, do not discontinue taking your medication and try taking with crackers or a non-protein snack (cracker, fruit). Also consider taking with ginger-madie (real ginger) to help with nausea.  - For intolerable symptoms, return to a previous dose that was not associated with side effects.    - Iron may interfere with the effectiveness of this medication so do not take iron supplements at the same time you are taking Sinemet.    - [employer] program  - Parkinson Disease resources provided   - Chew gum or have a candy during the day to improve drooling    Patient to return in 3-4 months, for in-person visit, 30 minutes.

## 2022-10-26 NOTE — PROGRESS NOTES
"Department of Neurology  Movement Disorders Division     Patient: Bob Norton   MRN: [id number]   : 1950   Date of Visit: 2022    Dear Colleague,     Thank you for referring your patient, Mr. Norton, to the [facility] NEUROLOGY at [facility]. Please see a copy of my visit note below.    Referring Provider: Kimberley Covarrubias DO    History of Present Illness  Mr. Norton is a 72 year old L handed male that presents to Neurology Movement clinic as a new patient for evaluation of tremors and shuffling.    History obtained from patient. Patient accompanied by daughter, Kimberley.   Patient reports  His symptom started 5 years ago with tremors in hands, right hand first. He had ulnar and nerve surgery and carpal tunnel release. After ulnar nerve surgery had complications and noticed tremors afterward.  Tremors in left hand started 5-6 months ago. Tremors occurs when writing, eating in left hand and dressing himself. Right hand tremors are worse. After stretching, tremors in both hands are prevalent. When stretching neck, hands will shake, R > L.  Symptom progression: Right hand tremor is worse/more intense. When walking slowly, he notice shuffling and when tired he is not as steady on feet. Denies falls. Mouth hands open and he drools often but doesn't have to use a handkerchief/tissues. Volume of speech is decreased and is asked to repeat self x 6 months. He is status post Speech Therapy and Occupational Therapy for tremor with some improvement.       Mr. Bob Norton current PD regimen: none     Previous therapies: none     Parkinson Disease Motor Symptom Review:  Motor fluctuations:     Freezing of gait: In the shower, when standing in one position for 5 minutes, he notices his legs have a harder time moving, R > L.   Dystonia: denies  Tremor: Details above. Denies resting tremor.   Rigidity: no  Bradykinesia: \"I do a lot of things " "slow. I don't have to hurry to do anything.\" He walks slowly due to Meniere's disease in both ears. Hasn't had any episodes from Meniere's in a couple years.  Has ativan prn for exacerbation of Meniere's.   Facial expression: Per daughter, resting face is a little more flat. Per patient, mouth hangs open frequently.   Balance/Falls: Has to be more careful when walking on grass or uneven surfaces. Worries that he will fall if he moves too quickly. Denies foot dragging.   Change in gait: yes, detailed above  Poor sense of smell for \"years\".  Takes topirimate for migraines which exacerbated Meniere's symptoms.   Takes gabapentin for nerve pain in right arm.      Parkinson's Disease Non-motor Symptom Review:  Sleep disturbances - Gets up 1-2 times a night to urinate due to prostate cancer, small. Not eligible for treatment. For the last 5 years, he was sleeping 3-5 hours of sleep as he was taking care of his wife with memory issues. Doesn't uses a CPAP, not for years. Denies active dreaming.   Urinary symptoms - see above. He urinates hourly. Follows with a urologist.  GI symptoms - BM 1-2 times daily.   Psychiatric disturbances - Former wife have just been placed in Memory Care.   Cognitive impairment -  No issues.  Hallucinations - denies  Pain - Pain in right hand form elbow from previous ulnar nerve repair.  Autonomic dysfunction - Intermittently gets dizzy when standing too quickly. Drinks 2 bottles of 18 ounces daily. He avoids drinking too much water as he already urinates frequently.   Speech - Decrease volume in voice.   Swallowing - Yes due to phlegm in throat and started on a saline nebulizer for this. Food gets caught in phlegm and this causes him to cough.  Salivation - detailed above  Dopamine agonist side effects - n/a   History of dopamine depleting therapies - denies  Family history of Parkinsonism: Mom with Essential Tremor.   Patient is oldest of 6 boys.      Additional history:   Driving: no " issues  Living situation: Moved several years ago to [place], [place] and lives on own.   Medication compliance: yes  ADL's: the patient is independent      Review of Systems:  Other than that mentioned above, the remainder of 12 systems reviewed were negative.    History reviewed. No pertinent past medical history.  History reviewed. No pertinent surgical history.  History reviewed. No pertinent family history.  Social History     Socioeconomic History     Marital status:      Spouse name: Not on file     Number of children: Not on file     Years of education: Not on file     Highest education level: Not on file   Occupational History     Not on file   Tobacco Use     Smoking status: Never     Smokeless tobacco: Never   Substance and Sexual Activity     Alcohol use: Not on file     Drug use: Not on file     Sexual activity: Not on file   Other Topics Concern     Not on file   Social History Narrative     Not on file     Social Determinants of Health     Financial Resource Strain: Not on file   Food Insecurity: Not on file   Transportation Needs: Not on file   Physical Activity: Not on file   Stress: Not on file   Social Connections: Not on file   Intimate Partner Violence: Not on file   Housing Stability: Not on file     Medications:  Current Outpatient Medications   Medication Sig Dispense Refill     acetaminophen (TYLENOL) 500 MG tablet Take 500-1,000 mg by mouth At Bedtime       alfuzosin ER (UROXATRAL) 10 MG 24 hr tablet Take 10 mg by mouth daily       atorvastatin (LIPITOR) 40 MG tablet Take 40 mg by mouth daily       carbidopa-levodopa (SINEMET)  MG tablet Week 1 take 0.5 tabs three times daily. Week 2 take 1 tab three times daily. Stop at lowest effective dose. Take medication an hour before or after a protein rich brenda. 90 tablet 11     famotidine (PEPCID) 20 MG tablet Take 20 mg by mouth 2 times daily       gabapentin (NEURONTIN) 300 MG capsule Take 1 capsule by mouth At Bedtime        hydrochlorothiazide (HYDRODIURIL) 25 MG tablet Take 25 mg by mouth daily as needed       LORazepam (ATIVAN) 1 MG tablet Take 1 mg by mouth 2 times daily as needed       meclizine (ANTIVERT) 25 MG tablet Take 25 mg by mouth 2 times daily as needed       potassium chloride ER (K-TAB/KLOR-CON) 10 MEQ CR tablet Take 40 mEq by mouth daily       topiramate (TOPAMAX) 25 MG tablet Take 25 mg by mouth 2 times daily       triamterene-HCTZ (MAXZIDE) 75-50 MG tablet Take 0.5 tablets by mouth       warfarin ANTICOAGULANT (COUMADIN) 5 MG tablet Take 5 mg by mouth daily 5mg//MWF  2.5 mg//STTS             Allergies   Allergen Reactions     Metronidazole Hives and Rash     Other reaction(s): SKIN - rash  Was taking this medication at the same time as sulfameth/trimethoprim, had a reaction when he finished both prescriptions.       Sulfamethoxazole-Trimethoprim Rash and Hives     Was taking this with metronidazole, and when finished with the prescription, broke out in hives.     Codeine Nausea and Vomiting     Halogenated Anesthetics      Other reaction(s): GENERAL - sleep disorders     Hydrocodone-Acetaminophen Nausea and Vomiting and Nausea     Other reaction(s): Intolerance  Vomiting       Lisinopril Cough     Morphine Nausea and Vomiting     Venlafaxine      Other reaction(s): OTHER (explain in comments), UNKNOWN  CNS  CNS           Physical Exam:  The patient's  blood pressure is 103/64 and his pulse is 65.    Physical Exam:  GENERAL: alert, active, attentive, appropriately groomed   HEENT: normocephalic, eyes open with no discharge, nares patent, oropharynx clear-no lesions  CHEST: non labored breathing  EXTREMITIES: no edema/cyanosis in BUE/BLE  PSYCH: mood stable, flat affect    Neurologic Exam:  MENTAL STATUS: Alert and oriented to person, place, time, and situation. Follows commands. Recent and remote memory intact. Attention span and concentration intact. Fund of knowledge intact to current events. Able to spell WORLD  backwards. Able to name an object and describe its function.   SPEECH: Fluent, intact comprehension and articulation  CN: visual fields intact in all fields, EOMIB, no nystagmus, facial sensation intact, facial movement symmetric, hearing grossly intact to conversation, tongue protrudes midline   MOTOR: Moves all extremities equally against gravity without  Involuntary movements:   REFLEXES (R/L): 2/2  in biceps, brachioradialis, patellars  SENSATION: intact to light touch throughout   COORDINATION: no dysmetria with FTN  GAIT: able to rise unassisted from a seated position, decreased bilateral arm swing and normal stride length, no en bloc turns, no ataxia    UPDRS Values 10/26/2022   Medication Off   R Brain DBS: None   L Brain DBS: None   Dyskinesia (LID) No   Speech 1   Facial Expression 3   Rigidity Neck 3   Rigidity RUE 1   Rigidity LUE 2   Rigidity RLE 0   Rigidity LLE 0   Finger Taps R 2   Finger Taps L 1   Hand Mvt R 1   Hand Mvt L 0   Pron-/Supinate R 1   Pron-/Supinate L 3   Toe Tap R 0   Toe Tap L 0   Leg Agility R 0   Leg Agility L 0   Arise From Chair 0   Gait 0   Gait Freezing 0   Postural Stability 0   Posture 1   Global Spont Mvt 0   Postural Tremor RUE 0   Postural Tremor LUE 0   Kinetic Tremor RUE 2   Kinetic Tremor LUE 1   Rest Tremor RUE 1   Rest Tremor LUE 0   Rest Tremor RLE 0   Rest Tremor LLE 0   Rest Tremor Lip/Jaw 0   Rest Tremor Constancy 0   Total Right 8   Total Left 7   Axial Total 8   Total 23       Data Reviewed: I have personally reviewed the tests/studies below.   - MRI of the cervical spine does show mild central canal stenosis but no abnormality of the spinal cord.      Impression:  Bob Norton is a 72 year old male with signs and symptoms consistent with Parkinson Disease. This diagnosis was thoroughly discussed including prognosis and management.     Parkinson Disease: Symptom started 5 years ago (2017) with tremors in hands, right hand. Then about 5-6 months ago tremors  progressed to left hand. Symptoms progressed to hypophonia, shuffling gait, masked facies, drooling.   Sialorrhea   Fatigue     Plan:   - MRI brain images to be provided and scanned to chart  - Start Sinemet (carbidopa/levodopa) 25/100 mg IR as treatment for Parkinson's disease. Use the following schedule to start Sinemet:  Sinemet (CD/LD) 25/100 mg IR AM Noon PM   Week 1                         0.5 tab 0.5 tab 0.5 tab   Week 2             1 1 1    - Take Sinemet one hour before a meal time or one hour after a meal   - May stop at dose that improves symptoms. I would expect your symptoms of bradykinesia (slowness), neck stiffness, tremor, and, to some degree, postural instability or trouble walking  (shuffling). Freezing of gait may or may not respond to this medication.   - Common Side Effects discussed including: dizziness, nausea, constipation. If nausea or vomiting should occur, do not discontinue taking your medication and try taking with crackers or a non-protein snack (cracker, fruit). Also consider taking with ginger-maide (real ginger) to help with nausea.  - For intolerable symptoms, return to a previous dose that was not associated with side effects.    - Iron may interfere with the effectiveness of this medication so do not take iron supplements at the same time you are taking Sinemet.    - [employer] program  - Parkinson Disease resources provided   - Chew gum or have a candy during the day to improve drooling    Patient to return in 3-4 months, for in-person visit, 30 minutes.     Santa Desouza DO, MA   of Neurology   [facility]     67 minutes spent on date of encounter doing chart reviews and exam and documentation and further activities as noted above.

## 2022-10-26 NOTE — NURSING NOTE
Chief Complaint   Patient presents with     Tremors     Referred by SHAWN Sagastume on 10/26/2022 at 10:36 AM

## 2022-10-26 NOTE — LETTER
10/26/2022         RE: Bob Norton  [address]  [place] [address]        Dear Colleague,    Thank you for referring your patient, Bob Norton, to the [facility]. Please see a copy of my visit note below.    Department of Neurology  Movement Disorders Division     Patient: Bob Norton   MRN: [id number]   : 1950   Date of Visit: 2022    Dear Colleague,     Thank you for referring your patient, Mr. Norton, to the [facility] NEUROLOGY at [facility]. Please see a copy of my visit note below.    Referring Provider: Kimberley Covarrubias DO    History of Present Illness  Mr. Norton is a 72 year old L handed male that presents to Neurology Movement clinic as a new patient for evaluation of tremors and shuffling.    History obtained from patient. Patient accompanied by daughter, Kimberley.   Patient reports  His symptom started 5 years ago with tremors in hands, right hand first. He had ulnar and nerve surgery and carpal tunnel release. After ulnar nerve surgery had complications and noticed tremors afterward.  Tremors in left hand started 5-6 months ago. Tremors occurs when writing, eating in left hand and dressing himself. Right hand tremors are worse. After stretching, tremors in both hands are prevalent. When stretching neck, hands will shake, R > L.  Symptom progression: Right hand tremor is worse/more intense. When walking slowly, he notice shuffling and when tired he is not as steady on feet. Denies falls. Mouth hands open and he drools often but doesn't have to use a handkerchief/tissues. Volume of speech is decreased and is asked to repeat self x 6 months. He is status post Speech Therapy and Occupational Therapy for tremor with some improvement.       Mr. Bob Norton current PD regimen: none     Previous therapies: none     Parkinson Disease Motor Symptom Review:  Motor  "fluctuations:     Freezing of gait: In the shower, when standing in one position for 5 minutes, he notices his legs have a harder time moving, R > L.   Dystonia: denies  Tremor: Details above. Denies resting tremor.   Rigidity: no  Bradykinesia: \"I do a lot of things slow. I don't have to hurry to do anything.\" He walks slowly due to Meniere's disease in both ears. Hasn't had any episodes from Meniere's in a couple years.  Has ativan prn for exacerbation of Meniere's.   Facial expression: Per daughter, resting face is a little more flat. Per patient, mouth hangs open frequently.   Balance/Falls: Has to be more careful when walking on grass or uneven surfaces. Worries that he will fall if he moves too quickly. Denies foot dragging.   Change in gait: yes, detailed above  Poor sense of smell for \"years\".  Takes topirimate for migraines which exacerbated Meniere's symptoms.   Takes gabapentin for nerve pain in right arm.      Parkinson's Disease Non-motor Symptom Review:  Sleep disturbances - Gets up 1-2 times a night to urinate due to prostate cancer, small. Not eligible for treatment. For the last 5 years, he was sleeping 3-5 hours of sleep as he was taking care of his wife with memory issues. Doesn't uses a CPAP, not for years. Denies active dreaming.   Urinary symptoms - see above. He urinates hourly. Follows with a urologist.  GI symptoms - BM 1-2 times daily.   Psychiatric disturbances - Former wife have just been placed in Memory Care.   Cognitive impairment -  No issues.  Hallucinations - denies  Pain - Pain in right hand form elbow from previous ulnar nerve repair.  Autonomic dysfunction - Intermittently gets dizzy when standing too quickly. Drinks 2 bottles of 18 ounces daily. He avoids drinking too much water as he already urinates frequently.   Speech - Decrease volume in voice.   Swallowing - Yes due to phlegm in throat and started on a saline nebulizer for this. Food gets caught in phlegm and this causes " him to cough.  Salivation - detailed above  Dopamine agonist side effects - n/a   History of dopamine depleting therapies - denies  Family history of Parkinsonism: Mom with Essential Tremor.   Patient is oldest of 6 boys.      Additional history:   Driving: no issues  Living situation: Moved several years ago to [place], [place] and lives on own.   Medication compliance: yes  ADL's: the patient is independent      Review of Systems:  Other than that mentioned above, the remainder of 12 systems reviewed were negative.    History reviewed. No pertinent past medical history.  History reviewed. No pertinent surgical history.  History reviewed. No pertinent family history.  Social History     Socioeconomic History     Marital status:      Spouse name: Not on file     Number of children: Not on file     Years of education: Not on file     Highest education level: Not on file   Occupational History     Not on file   Tobacco Use     Smoking status: Never     Smokeless tobacco: Never   Substance and Sexual Activity     Alcohol use: Not on file     Drug use: Not on file     Sexual activity: Not on file   Other Topics Concern     Not on file   Social History Narrative     Not on file     Social Determinants of Health     Financial Resource Strain: Not on file   Food Insecurity: Not on file   Transportation Needs: Not on file   Physical Activity: Not on file   Stress: Not on file   Social Connections: Not on file   Intimate Partner Violence: Not on file   Housing Stability: Not on file     Medications:  Current Outpatient Medications   Medication Sig Dispense Refill     acetaminophen (TYLENOL) 500 MG tablet Take 500-1,000 mg by mouth At Bedtime       alfuzosin ER (UROXATRAL) 10 MG 24 hr tablet Take 10 mg by mouth daily       atorvastatin (LIPITOR) 40 MG tablet Take 40 mg by mouth daily       carbidopa-levodopa (SINEMET)  MG tablet Week 1 take 0.5 tabs three times daily. Week 2 take 1 tab three times daily. Stop at  lowest effective dose. Take medication an hour before or after a protein rich brenda. 90 tablet 11     famotidine (PEPCID) 20 MG tablet Take 20 mg by mouth 2 times daily       gabapentin (NEURONTIN) 300 MG capsule Take 1 capsule by mouth At Bedtime       hydrochlorothiazide (HYDRODIURIL) 25 MG tablet Take 25 mg by mouth daily as needed       LORazepam (ATIVAN) 1 MG tablet Take 1 mg by mouth 2 times daily as needed       meclizine (ANTIVERT) 25 MG tablet Take 25 mg by mouth 2 times daily as needed       potassium chloride ER (K-TAB/KLOR-CON) 10 MEQ CR tablet Take 40 mEq by mouth daily       topiramate (TOPAMAX) 25 MG tablet Take 25 mg by mouth 2 times daily       triamterene-HCTZ (MAXZIDE) 75-50 MG tablet Take 0.5 tablets by mouth       warfarin ANTICOAGULANT (COUMADIN) 5 MG tablet Take 5 mg by mouth daily 5mg//MWF  2.5 mg//STTS             Allergies   Allergen Reactions     Metronidazole Hives and Rash     Other reaction(s): SKIN - rash  Was taking this medication at the same time as sulfameth/trimethoprim, had a reaction when he finished both prescriptions.       Sulfamethoxazole-Trimethoprim Rash and Hives     Was taking this with metronidazole, and when finished with the prescription, broke out in hives.     Codeine Nausea and Vomiting     Halogenated Anesthetics      Other reaction(s): GENERAL - sleep disorders     Hydrocodone-Acetaminophen Nausea and Vomiting and Nausea     Other reaction(s): Intolerance  Vomiting       Lisinopril Cough     Morphine Nausea and Vomiting     Venlafaxine      Other reaction(s): OTHER (explain in comments), UNKNOWN  CNS  CNS           Physical Exam:  The patient's  blood pressure is 103/64 and his pulse is 65.    Physical Exam:  GENERAL: alert, active, attentive, appropriately groomed   HEENT: normocephalic, eyes open with no discharge, nares patent, oropharynx clear-no lesions  CHEST: non labored breathing  EXTREMITIES: no edema/cyanosis in BUE/BLE  PSYCH: mood stable, flat  affect    Neurologic Exam:  MENTAL STATUS: Alert and oriented to person, place, time, and situation. Follows commands. Recent and remote memory intact. Attention span and concentration intact. Fund of knowledge intact to current events. Able to spell WORLD backwards. Able to name an object and describe its function.   SPEECH: Fluent, intact comprehension and articulation  CN: visual fields intact in all fields, EOMIB, no nystagmus, facial sensation intact, facial movement symmetric, hearing grossly intact to conversation, tongue protrudes midline   MOTOR: Moves all extremities equally against gravity without  Involuntary movements:   REFLEXES (R/L): 2/2  in biceps, brachioradialis, patellars  SENSATION: intact to light touch throughout   COORDINATION: no dysmetria with FTN  GAIT: able to rise unassisted from a seated position, decreased bilateral arm swing and normal stride length, no en bloc turns, no ataxia    UPDRS Values 10/26/2022   Medication Off   R Brain DBS: None   L Brain DBS: None   Dyskinesia (LID) No   Speech 1   Facial Expression 3   Rigidity Neck 3   Rigidity RUE 1   Rigidity LUE 2   Rigidity RLE 0   Rigidity LLE 0   Finger Taps R 2   Finger Taps L 1   Hand Mvt R 1   Hand Mvt L 0   Pron-/Supinate R 1   Pron-/Supinate L 3   Toe Tap R 0   Toe Tap L 0   Leg Agility R 0   Leg Agility L 0   Arise From Chair 0   Gait 0   Gait Freezing 0   Postural Stability 0   Posture 1   Global Spont Mvt 0   Postural Tremor RUE 0   Postural Tremor LUE 0   Kinetic Tremor RUE 2   Kinetic Tremor LUE 1   Rest Tremor RUE 1   Rest Tremor LUE 0   Rest Tremor RLE 0   Rest Tremor LLE 0   Rest Tremor Lip/Jaw 0   Rest Tremor Constancy 0   Total Right 8   Total Left 7   Axial Total 8   Total 23       Data Reviewed: I have personally reviewed the tests/studies below.   - MRI of the cervical spine does show mild central canal stenosis but no abnormality of the spinal cord.      Impression:  Bob Norton is a 72 year old male with  signs and symptoms consistent with Parkinson Disease. This diagnosis was thoroughly discussed including prognosis and management.     Parkinson Disease: Symptom started 5 years ago (2017) with tremors in hands, right hand. Then about 5-6 months ago tremors progressed to left hand. Symptoms progressed to hypophonia, shuffling gait, masked facies, drooling.   Sialorrhea   Fatigue     Plan:   - MRI brain images to be provided and scanned to chart  - Start Sinemet (carbidopa/levodopa) 25/100 mg IR as treatment for Parkinson's disease. Use the following schedule to start Sinemet:  Sinemet (CD/LD) 25/100 mg IR AM Noon PM   Week 1                         0.5 tab 0.5 tab 0.5 tab   Week 2             1 1 1    - Take Sinemet one hour before a meal time or one hour after a meal   - May stop at dose that improves symptoms. I would expect your symptoms of bradykinesia (slowness), neck stiffness, tremor, and, to some degree, postural instability or trouble walking  (shuffling). Freezing of gait may or may not respond to this medication.   - Common Side Effects discussed including: dizziness, nausea, constipation. If nausea or vomiting should occur, do not discontinue taking your medication and try taking with crackers or a non-protein snack (cracker, fruit). Also consider taking with ginger-madie (real ginger) to help with nausea.  - For intolerable symptoms, return to a previous dose that was not associated with side effects.    - Iron may interfere with the effectiveness of this medication so do not take iron supplements at the same time you are taking Sinemet.    - [employer] program  - Parkinson Disease resources provided   - Chew gum or have a candy during the day to improve drooling    Patient to return in 3-4 months, for in-person visit, 30 minutes.     Santa Desouza DO MA   of Neurology   [facility]     67 minutes spent on date of encounter doing chart reviews and exam and  documentation and further activities as noted above.                Again, thank you for allowing me to participate in the care of your patient.        Sincerely,        Santa Desouza, DO

## 2022-11-04 ENCOUNTER — TRANSFERRED RECORDS (OUTPATIENT)
Dept: HEALTH INFORMATION MANAGEMENT | Facility: CLINIC | Age: 72
End: 2022-11-04

## 2022-11-10 ENCOUNTER — DOCUMENTATION ONLY (OUTPATIENT)
Dept: NEUROLOGY | Facility: CLINIC | Age: 72
End: 2022-11-10

## 2022-11-10 NOTE — PROGRESS NOTES
Received order from [facility], order was signed by provider and fax to [phone], and sent to be scanned

## 2023-03-01 ENCOUNTER — OFFICE VISIT (OUTPATIENT)
Dept: NEUROLOGY | Facility: CLINIC | Age: 73
End: 2023-03-01
Payer: MEDICARE

## 2023-03-01 VITALS — SYSTOLIC BLOOD PRESSURE: 116 MMHG | HEART RATE: 65 BPM | DIASTOLIC BLOOD PRESSURE: 66 MMHG

## 2023-03-01 DIAGNOSIS — G20.A1 PARKINSON DISEASE (H): Primary | ICD-10-CM

## 2023-03-01 DIAGNOSIS — F32.A DEPRESSION, UNSPECIFIED DEPRESSION TYPE: ICD-10-CM

## 2023-03-01 DIAGNOSIS — R49.0 DYSPHONIA: ICD-10-CM

## 2023-03-01 PROCEDURE — 99214 OFFICE O/P EST MOD 30 MIN: CPT | Performed by: PSYCHIATRY & NEUROLOGY

## 2023-03-01 RX ORDER — CITALOPRAM HYDROBROMIDE 20 MG/1
20 TABLET ORAL DAILY
COMMUNITY
Start: 2023-02-24

## 2023-03-01 RX ORDER — LORAZEPAM 0.5 MG/1
TABLET ORAL
COMMUNITY
Start: 2023-02-24

## 2023-03-01 RX ORDER — CARBIDOPA AND LEVODOPA 25; 100 MG/1; MG/1
1.5 TABLET ORAL 3 TIMES DAILY
Qty: 405 TABLET | Refills: 3 | Status: SHIPPED | OUTPATIENT
Start: 2023-03-01 | End: 2024-03-18

## 2023-03-01 RX ORDER — WARFARIN SODIUM 5 MG/1
TABLET ORAL
COMMUNITY
Start: 2021-12-27

## 2023-03-01 RX ORDER — TOPIRAMATE 25 MG/1
1 TABLET, FILM COATED ORAL 2 TIMES DAILY
COMMUNITY
Start: 2022-09-23 | End: 2024-03-06

## 2023-03-01 ASSESSMENT — UNIFIED PARKINSONS DISEASE RATING SCALE (UPDRS)
DYSKINESIAS_PRESENT: NO
FINGER_TAPPING_LEFT: (1) SLIGHT: ANY OF THE FOLLOWING: A) THE REGULAR RHYTHM IS BROKEN WITH ONE WITH ONE OR TWO INTERRUPTIONS OR HESITATIONS OF THE MOVEMENT  B) SLIGHT SLOWING  C) THE AMPLITUDE DECREMENTS NEAR THE END OF THE 10 MOVEMENTS.
PRONATION_SUPINATION_RIGHT: (0) NORMAL: NO PROBLEMS.
SPEECH: (1) SLIGHT: LOSS OF MODULATION, DICTION OR VOLUME, BUT STILL ALL WORDS EASY TO UNDERSTAND.
RIGIDITY_RLE: (0) NORMAL: NO RIGIDITY.
CONSTANCY_TREMOR_ATREST: (0) NORMAL: NO TREMOR.
TOETAPPING_RIGHT: (0) NORMAL: NO PROBLEMS.
GAIT: (0) NORMAL: NO PROBLEMS.
FINGER_TAPPING_RIGHT: (0) NORMAL: NO PROBLEMS.
SPONTANEITY_OF_MOVEMENT: (1) SLIGHT: SLIGHT GLOBAL SLOWNESS AND POVERTY OF SPONTANEOUS MOVEMENTS.
LEG_AGILITY_RIGHT: (0) NORMAL: NO PROBLEMS.
AMPLITUDE_LUE: (0) NORMAL: NO TREMOR.
AMPLITUDE_LIP_JAW: (0) NORMAL: NO TREMOR.
FACIAL_EXPRESSION: (1) SLIGHT: MINIMAL MASKED FACIES MANIFESTED ONLY BY DECREASED FREQUENCY OF BLINKING.
LEG_AGILITY_LEFT: (0) NORMAL: NO PROBLEMS.
AMPLITUDE_RUE: (0) NORMAL: NO TREMOR.
POSTURE: (0) NORMAL: NO PROBLEMS.
ARISING_CHAIR: (0) NORMAL: NO PROBLEMS. ABLE TO ARISE QUICKLY WITHOUT HESITATION.
AMPLITUDE_RLE: (0) NORMAL: NO TREMOR.
AMPLITUDE_LLE: (0) NORMAL: NO TREMOR.
RIGIDITY_RUE: (1) SLIGHT: RIGIDITY ONLY DETECTED WITH ACTIVATION MANEUVER.
HANDMOVEMENTS_RIGHT: (0) NORMAL: NO PROBLEMS.
FREEZING_GAIT: (0) NORMAL: NO FREEZING.
TOETAPPING_LEFT: (0) NORMAL: NO PROBLEMS.
TOTAL_SCORE_LEFT: 6
HANDMOVEMENTS_LEFT: (1) SLIGHT: ANY OF THE FOLLOWING: A) THE REGULAR RHYTHM IS BROKEN WITH ONE WITH ONE OR TWO INTERRUPTIONS OR HESITATIONS OF THE MOVEMENT  B) SLIGHT SLOWING  C) THE AMPLITUDE DECREMENTS NEAR THE END OF THE 10 MOVEMENTS.
RIGIDITY_LLE: (0) NORMAL: NO RIGIDITY.
RIGIDITY_NECK: (1) SLIGHT: RIGIDITY ONLY DETECTED WITH ACTIVATION MANEUVER.
RIGIDITY_LUE: (2) MILD: RIGIDITY DETECTED WITHOUT THE ACTIVATION MANEUVER. FULL RANGE OF MOTION IS EASILY ACHIEVED.
PRONATION_SUPINATION_LEFT: (1) SLIGHT: ANY OF THE FOLLOWING: A) THE REGULAR RHYTHM IS BROKEN WITH ONE WITH ONE OR TWO INTERRUPTIONS OR HESITATIONS OF THE MOVEMENT  B) SLIGHT SLOWING  C) THE AMPLITUDE DECREMENTS NEAR THE END OF THE 10 MOVEMENTS.
PARKINSONS_MEDS: ON
TOTAL_SCORE: 4

## 2023-03-01 NOTE — PATIENT INSTRUCTIONS
Plan:   - Increase dose of carbidopa/levodopa to 1.5 tabs three times daily   - Follow with therapy  - Continue management of mood with Dr. Stewart.   - Referral with Psychiatry     Patient to return in 3 months, for in-person visit, 30 minutes.

## 2023-03-01 NOTE — LETTER
"    3/1/2023         RE: Bob Norton  [address]  [facility] [address]        Dear Colleague,    Thank you for referring your patient, Bob Norton, to the [facility] NEUROLOGY CLINIC [facility]. Please see a copy of my visit note below.    Department of Neurology  Movement Disorders Division     Patient: Bob Norton   MRN: [id number]   : 1950   Date of Visit: 2023    History of Present Illness  Mr. Norton is a 72 year old male that presents to Neurology Movement clinic for follow up regarding Parkinson Disease management.     History obtained from patient. Patient is accompanied by daughter, Kimberley.  Patient reports that walking is improved and is walking upright. Feels teeth chattering. Left hand is worse and has difficulty with writing. Has occasional good days and is able to read his handwriting. Left hand is tingling. He is able to get up out of chair much easier. Goal is to write checks and work on computer. Able to rotate head in both directions with more ease.   Carbidopa/levodopa dose: denies dizziness, nausea, vomiting. Avoids protein with carbidopa/levodopa.   Tremors: No improvement with current dose of carbidopa/levodopa. Tremors improve later in the day at times.   Drooling: no issues  Fatigue: Has good and bad days.  Has been doing Speech Therapy and feels that it improves volume when speaking.   2 weeks ago he was in a 72 hour hold as he \"couldn't take it anymore.\" Support system were all out of town. Currently taking citalopram 10 mg and ativan 0.5 mg. Citalopram just incrased while in the hospital.   Movement Disorder-related Medications                   8 am 2 pm 6-7 pm     Carbidopa/levodopa IR 25/100 mg  1 1 1                             Last taken at 1130 pm  Above schedule fluctuates based on the day.     Review of Systems:  Other than that mentioned above, the remainder of 12 systems reviewed were negative.    PMH: unchanged  PSH: " unchanged  FH: unchanged  SH: unchanged    Medications:  Current Outpatient Medications   Medication Sig Dispense Refill     acetaminophen (TYLENOL) 500 MG tablet Take 500-1,000 mg by mouth At Bedtime       alfuzosin ER (UROXATRAL) 10 MG 24 hr tablet Take 10 mg by mouth daily       atorvastatin (LIPITOR) 40 MG tablet Take 40 mg by mouth daily       carbidopa-levodopa (SINEMET)  MG tablet Week 1 take 0.5 tabs three times daily. Week 2 take 1 tab three times daily. Stop at lowest effective dose. Take medication an hour before or after a protein rich brenda. 90 tablet 11     citalopram (CELEXA) 10 MG tablet        famotidine (PEPCID) 20 MG tablet Take 20 mg by mouth 2 times daily       gabapentin (NEURONTIN) 300 MG capsule Take 1 capsule by mouth At Bedtime       hydrochlorothiazide (HYDRODIURIL) 25 MG tablet Take 25 mg by mouth daily as needed       LORazepam (ATIVAN) 0.5 MG tablet        LORazepam (ATIVAN) 1 MG tablet Take 1 mg by mouth 2 times daily as needed       meclizine (ANTIVERT) 25 MG tablet Take 25 mg by mouth 2 times daily as needed       potassium chloride ER (K-TAB/KLOR-CON) 10 MEQ CR tablet Take 40 mEq by mouth daily       pyridOXINE (VITAMIN B6) 100 MG TABS Take 100 mg by mouth daily       topiramate (TOPAMAX) 25 MG tablet Take 25 mg by mouth 2 times daily       triamterene-HCTZ (MAXZIDE) 75-50 MG tablet Take 0.5 tablets by mouth       vitamin B-12 (CYANOCOBALAMIN) 1000 MCG tablet Take 1 tablet by mouth daily       warfarin ANTICOAGULANT (COUMADIN) 5 MG tablet Take by mouth 1 tab (5mg) on Mon, Wed, Fri and 0.5 tab (2.5mg) all other days OR as directed by INR nurse.       topiramate (TOPAMAX) 25 MG tablet Take 1 tablet by mouth 2 times daily (Patient not taking: Reported on 3/1/2023)       warfarin ANTICOAGULANT (COUMADIN) 5 MG tablet Take 5 mg by mouth daily 5mg//MWF  2.5 mg//STTS (Patient not taking: Reported on 3/1/2023)             Allergies   Allergen Reactions     Metronidazole Hives and Rash      Other reaction(s): SKIN - rash  Was taking this medication at the same time as sulfameth/trimethoprim, had a reaction when he finished both prescriptions.       Sulfamethoxazole-Trimethoprim Rash and Hives     Was taking this with metronidazole, and when finished with the prescription, broke out in hives.     Codeine Nausea and Vomiting     Halogenated Anesthetics      Other reaction(s): GENERAL - sleep disorders     Hydrocodone-Acetaminophen Nausea and Vomiting and Nausea     Other reaction(s): Intolerance  Vomiting       Lisinopril Cough     Morphine Nausea and Vomiting     Venlafaxine      Other reaction(s): OTHER (explain in comments), UNKNOWN  CNS  CNS            Physical Exam:  The patient's  blood pressure is 116/66 and his pulse is 65.    Bilateral hearing aids   UPDRS Motor Scale 3/1/2023   Time: 2:02 PM   Medication On   R Brain DBS: None   L Brain DBS: None   Dyskinesia (LID) No   Speech 1   Facial Expression 1   Rigidity Neck 1   Rigidity RUE 1   Rigidity LUE 2   Rigidity RLE 0   Rigidity LLE 0   Finger Taps R 0   Finger Taps L 1   Hand Mvt R 0   Hand Mvt L 1   Pron-/Supinate R 0   Pron-/Supinate L 1   Toe Tap R 0   Toe Tap L 0   Leg Agility R 0   Leg Agility L 0   Arise From Chair 0   Gait 0   Gait Freezing 0   Postural Stability    Posture 0   Global Spont Mvt 1   Postural Tremor RUE 1   Postural Tremor LUE 0   Kinetic Tremor RUE 2   Kinetic Tremor LUE 1   Rest Tremor RUE 0   Rest Tremor LUE 0   Rest Tremor RLE 0   Rest Tremor LLE 0   Rest Tremor Lip/Jaw 0   Rest Tremor Constancy 0   Total Right 4   Total Left 6   Axial Total    Total        Impression:  Bob Norton is a 72 year old male with Parkinson Disease. The patient's main concern today is left hand movements, tremors, bradykinesia, poor dexterity. He would like to improve his left hand symptoms to write. Despite the above, he does observe improvement is other Parkinson Disease symptoms with starting carbidopa/levodopa such as walking,  getting up from a seated position, stiffness in head/neck with improvement in rotating head and neck.     Parkinson Disease: Symptom started 5 years ago (2017) with tremors in hands, right hand. Then about 5-6 months ago tremors progressed to left hand. Symptoms progressed to hypophonia, shuffling gait, masked facies, drooling.  Depression: Recently placed on a 48 hour hold. While inpatient, medication adjustments were made to improve mood.   Fatigue     Plan:   - Increase dose of carbidopa/levodopa to 1.5 tabs three times daily   - Follow with therapy  - Continue management of mood with Dr. Stewart.   - Referral to Psychiatry for mood management      Patient to return in 3 months, for in-person visit, 30 minutes.     Santa Desouza DO, MA   of Neurology   [facility]    30 minutes spent on date of encounter doing chart reviews and exam and documentation and further activities as noted above.                      Again, thank you for allowing me to participate in the care of your patient.        Sincerely,        Santa Desouza DO

## 2023-03-01 NOTE — PROGRESS NOTES
"Department of Neurology  Movement Disorders Division     Patient: Bob Norton   MRN: [id number]   : 1950   Date of Visit: 2023    History of Present Illness  Mr. Norton is a 72 year old male that presents to Neurology Movement clinic for follow up regarding Parkinson Disease management.     History obtained from patient. Patient is accompanied by daughter, Kimberley.  Patient reports that walking is improved and is walking upright. Feels teeth chattering. Left hand is worse and has difficulty with writing. Has occasional good days and is able to read his handwriting. Left hand is tingling. He is able to get up out of chair much easier. Goal is to write checks and work on computer. Able to rotate head in both directions with more ease.   Carbidopa/levodopa dose: denies dizziness, nausea, vomiting. Avoids protein with carbidopa/levodopa.   Tremors: No improvement with current dose of carbidopa/levodopa. Tremors improve later in the day at times.   Drooling: no issues  Fatigue: Has good and bad days.  Has been doing Speech Therapy and feels that it improves volume when speaking.   2 weeks ago he was in a 72 hour hold as he \"couldn't take it anymore.\" Support system were all out of town. Currently taking citalopram 10 mg and ativan 0.5 mg. Citalopram just incrased while in the hospital.   Movement Disorder-related Medications                   8 am 2 pm 6-7 pm     Carbidopa/levodopa IR 25/100 mg  1 1 1                             Last taken at 1130 pm  Above schedule fluctuates based on the day.     Review of Systems:  Other than that mentioned above, the remainder of 12 systems reviewed were negative.    PMH: unchanged  PSH: unchanged  FH: unchanged  SH: unchanged    Medications:  Current Outpatient Medications   Medication Sig Dispense Refill     acetaminophen (TYLENOL) 500 MG tablet Take 500-1,000 mg by mouth At Bedtime       alfuzosin ER (UROXATRAL) 10 MG 24 hr tablet Take 10 mg by mouth " daily       atorvastatin (LIPITOR) 40 MG tablet Take 40 mg by mouth daily       carbidopa-levodopa (SINEMET)  MG tablet Week 1 take 0.5 tabs three times daily. Week 2 take 1 tab three times daily. Stop at lowest effective dose. Take medication an hour before or after a protein rich brenda. 90 tablet 11     citalopram (CELEXA) 10 MG tablet        famotidine (PEPCID) 20 MG tablet Take 20 mg by mouth 2 times daily       gabapentin (NEURONTIN) 300 MG capsule Take 1 capsule by mouth At Bedtime       hydrochlorothiazide (HYDRODIURIL) 25 MG tablet Take 25 mg by mouth daily as needed       LORazepam (ATIVAN) 0.5 MG tablet        LORazepam (ATIVAN) 1 MG tablet Take 1 mg by mouth 2 times daily as needed       meclizine (ANTIVERT) 25 MG tablet Take 25 mg by mouth 2 times daily as needed       potassium chloride ER (K-TAB/KLOR-CON) 10 MEQ CR tablet Take 40 mEq by mouth daily       pyridOXINE (VITAMIN B6) 100 MG TABS Take 100 mg by mouth daily       topiramate (TOPAMAX) 25 MG tablet Take 25 mg by mouth 2 times daily       triamterene-HCTZ (MAXZIDE) 75-50 MG tablet Take 0.5 tablets by mouth       vitamin B-12 (CYANOCOBALAMIN) 1000 MCG tablet Take 1 tablet by mouth daily       warfarin ANTICOAGULANT (COUMADIN) 5 MG tablet Take by mouth 1 tab (5mg) on Mon, Wed, Fri and 0.5 tab (2.5mg) all other days OR as directed by INR nurse.       topiramate (TOPAMAX) 25 MG tablet Take 1 tablet by mouth 2 times daily (Patient not taking: Reported on 3/1/2023)       warfarin ANTICOAGULANT (COUMADIN) 5 MG tablet Take 5 mg by mouth daily 5mg//MWF  2.5 mg//STTS (Patient not taking: Reported on 3/1/2023)             Allergies   Allergen Reactions     Metronidazole Hives and Rash     Other reaction(s): SKIN - rash  Was taking this medication at the same time as sulfameth/trimethoprim, had a reaction when he finished both prescriptions.       Sulfamethoxazole-Trimethoprim Rash and Hives     Was taking this with metronidazole, and when finished with  the prescription, broke out in hives.     Codeine Nausea and Vomiting     Halogenated Anesthetics      Other reaction(s): GENERAL - sleep disorders     Hydrocodone-Acetaminophen Nausea and Vomiting and Nausea     Other reaction(s): Intolerance  Vomiting       Lisinopril Cough     Morphine Nausea and Vomiting     Venlafaxine      Other reaction(s): OTHER (explain in comments), UNKNOWN  CNS  CNS            Physical Exam:  The patient's  blood pressure is 116/66 and his pulse is 65.    Bilateral hearing aids   UPDRS Motor Scale 3/1/2023   Time: 2:02 PM   Medication On   R Brain DBS: None   L Brain DBS: None   Dyskinesia (LID) No   Speech 1   Facial Expression 1   Rigidity Neck 1   Rigidity RUE 1   Rigidity LUE 2   Rigidity RLE 0   Rigidity LLE 0   Finger Taps R 0   Finger Taps L 1   Hand Mvt R 0   Hand Mvt L 1   Pron-/Supinate R 0   Pron-/Supinate L 1   Toe Tap R 0   Toe Tap L 0   Leg Agility R 0   Leg Agility L 0   Arise From Chair 0   Gait 0   Gait Freezing 0   Postural Stability    Posture 0   Global Spont Mvt 1   Postural Tremor RUE 1   Postural Tremor LUE 0   Kinetic Tremor RUE 2   Kinetic Tremor LUE 1   Rest Tremor RUE 0   Rest Tremor LUE 0   Rest Tremor RLE 0   Rest Tremor LLE 0   Rest Tremor Lip/Jaw 0   Rest Tremor Constancy 0   Total Right 4   Total Left 6   Axial Total    Total        Impression:  Bob Norton is a 72 year old male with Parkinson Disease. The patient's main concern today is left hand movements, tremors, bradykinesia, poor dexterity. He would like to improve his left hand symptoms to write. Despite the above, he does observe improvement is other Parkinson Disease symptoms with starting carbidopa/levodopa such as walking, getting up from a seated position, stiffness in head/neck with improvement in rotating head and neck.     Parkinson Disease: Symptom started 5 years ago (2017) with tremors in hands, right hand. Then about 5-6 months ago tremors progressed to left hand. Symptoms  progressed to hypophonia, shuffling gait, masked facies, drooling.  Depression: Recently placed on a 48 hour hold. While inpatient, medication adjustments were made to improve mood.   Fatigue     Plan:   - Increase dose of carbidopa/levodopa to 1.5 tabs three times daily   - Follow with therapy  - Continue management of mood with Dr. Stewart.   - Referral to Psychiatry for mood management      Patient to return in 3 months, for in-person visit, 30 minutes.     Santa Desouza DO, MA   of Neurology   [facility]    30 minutes spent on date of encounter doing chart reviews and exam and documentation and further activities as noted above.

## 2023-03-07 ENCOUNTER — TRANSFERRED RECORDS (OUTPATIENT)
Dept: HEALTH INFORMATION MANAGEMENT | Facility: CLINIC | Age: 73
End: 2023-03-07

## 2023-03-15 ENCOUNTER — DOCUMENTATION ONLY (OUTPATIENT)
Dept: OTHER | Facility: CLINIC | Age: 73
End: 2023-03-15
Payer: MEDICARE

## 2023-04-06 ENCOUNTER — OFFICE VISIT (OUTPATIENT)
Dept: NEUROLOGY | Facility: CLINIC | Age: 73
End: 2023-04-06
Payer: MEDICARE

## 2023-04-06 VITALS — DIASTOLIC BLOOD PRESSURE: 66 MMHG | HEART RATE: 72 BPM | SYSTOLIC BLOOD PRESSURE: 109 MMHG

## 2023-04-06 DIAGNOSIS — F32.A DEPRESSION, UNSPECIFIED DEPRESSION TYPE: ICD-10-CM

## 2023-04-06 PROCEDURE — 99204 OFFICE O/P NEW MOD 45 MIN: CPT | Performed by: PSYCHIATRY & NEUROLOGY

## 2023-04-06 NOTE — PATIENT INSTRUCTIONS
The patient presented for a second opinion and I agree with the recent assessment and treatment plan.  I have made no medication changes and will see the patient back on a as needed basis in the future.  The patient will continue to follow-up with neurology as per their recommendation and his primary care provider.

## 2023-04-06 NOTE — PROGRESS NOTES
"Initial clinic intake note:    The patient was seen for an intake to this clinic on April 6, 2022.  The patient has a prior history of depression in the context of his wife's declining health as she has Alzheimer's disease.  He is being followed by Dr. Stewart for primary care and does not want to move from that clinic as he likes it there.  He is being followed for Parkinson's disease through our neurology clinic.  He states Dr. Stewart diagnosed him and has been treating him for depression and anxiety.  In January she started him on low-dose citalopram as well as as needed Ativan and that was helpful.  He and his wife are  and they do have other family members that are concerned about her declining cognitive ability.  The patient wanted to move her to [place] where he could be closer to her but the family has stepped in and felt that was not in her best interest.  The patient became upset and in February \"turned himself in\" to an emergency room in [facility] where he was admitted for a couple of days.  They apparently continued the medications.  The patient had previously been power of  but he no longer is and he believes that precipitated his decline.  He denies having any current thoughts of hurting himself or anybody else and admits that since his return home after his hospitalization he has had no such thoughts.  He denies having any significant premorbid history.  He has never had a suicide attempt.  Even during the recent crisis he had no plan and did not think he would follow through with it.  No symptoms of khadar.  No psychosis.  As stated above, he likes his current primary care doctor but was sent here by our neurology clinic just to have additional input on the current treatment plan.    HPI:  The patient presents at this time with history as described above.  Currently he reports he is doing better.  He is frustrated by his current circumstances as described above.  He has no access to " firearms and is able to contract for his safety.  He states he feels guilty because his wife is living so far away from him and he feels as if he let her down.  They were  a number of years ago but he states they recently had their marriage blessed.  The patient does understand that perhaps it is best for his wife to stay in [place] at her current placement and she has children there that are supporting the patient but he does not feel they are spending enough time with her so he feels guilty.  The patient denies having any hallucinations or delusions.  No new medical issues or concerns.  No side effects to the medication.  The patient has been set up with psychology support and states he has been to a couple of visits and that is going well.  He is tolerating the medication and there is been no new medical issues or concerns.    On my interview with the patient today he denies having any suicidality.  No psychosis and no khadar.  He is sleeping better with use of the Ativan at night and I did talk with him about long-term use of that medic patient and the potential negative effects.  At this time it seems to be helpful.  He has recently started up with a therapist and states he will follow through with that.  The patient is medically stable and being followed as described above.  At this time I did spend quite a bit of time talking with him about differential diagnoses and some of the psychosocial stressors he is under but I agree with the current treatment plan.      Allergies   Allergen Reactions     Metronidazole Hives and Rash       Other reaction(s): SKIN - rash  Was taking this medication at the same time as sulfameth/trimethoprim, had a reaction when he finished both prescriptions.        Sulfamethoxazole-Trimethoprim Rash and Hives       Was taking this with metronidazole, and when finished with the prescription, broke out in hives.     Codeine Nausea and Vomiting     Halogenated Anesthetics          Other reaction(s): GENERAL - sleep disorders     Hydrocodone-Acetaminophen Nausea and Vomiting and Nausea       Other reaction(s): Intolerance  Vomiting        Lisinopril Cough     Morphine Nausea and Vomiting     Venlafaxine         Other reaction(s): OTHER (explain in comments), UNKNOWN  CNS  CNS         Past medical history:    Please see the chart for full medical details.  Recent, relevant records are as described below.    A recent neurology referral note is as follows:  Impression:  Bob Norton is a 72 year old male with Parkinson Disease. The patient's main concern today is left hand movements, tremors, bradykinesia, poor dexterity. He would like to improve his left hand symptoms to write. Despite the above, he does observe improvement is other Parkinson Disease symptoms with starting carbidopa/levodopa such as walking, getting up from a seated position, stiffness in head/neck with improvement in rotating head and neck.      Parkinson Disease: Symptom started 5 years ago (2017) with tremors in hands, right hand. Then about 5-6 months ago tremors progressed to left hand. Symptoms progressed to hypophonia, shuffling gait, masked facies, drooling.  Depression: Recently placed on a 48 hour hold. While inpatient, medication adjustments were made to improve mood.   Fatigue      Plan:   - Increase dose of carbidopa/levodopa to 1.5 tabs three times daily   - Follow with therapy  - Continue management of mood with Dr. Stewart.   - Referral to Psychiatry for mood management     A recent physical therapy note is as follows:   ASSESSMENT AND SUMMARY: Pramod is a 72 year old male who initially presented to physical therapy with bradykinesia, hypokinesia, dyskinesia, fatigue, and bilateral UE tremor (right greater than left). Pramod felt at the time that these symptoms were minimally impacting his daily life. However, he did indicate that the weakness in his hands along with the tremor do affect his ability to use his  hands like he would prefer. He completed the 5 Time Sit to Stand Test in 8.09 seconds, which is an improvement in his time from June of 2022, which was 16.63 seconds. He also completed the 3-Step TUG within norms for community living adults, and with slightly improved times compared to when assessed in June of 2022. He scored a 22/30 on the Functional Gait Assessment which indicated a slight increased falls risk within the next year. His past medical history is significant for Meniere's disease and Lupus, both of which could impact his symptoms from Parkinson's Disease. He was self-limiting his recreational activities (ie fly fishing) due to his UE tremor and other PD symptoms. He shared concerns on 11/05/2022 about being able to continue to live on his own; but his ABC Scale showed 98.44% confidence in his safety with mobility. He attended one additional visit in November to work on creating a home program that he could complete until the new year (2023) when he was ready to participate in [facility]. He presented for a follow up on 03/07/2023 and for his first [facility] session. Upon this visit, his 5 Time Sit to Stand and TUG tests were within 0.5-1 second of their time on 11/04/2022. He did show improvement in his FGA score, with a 25/30, indicating less of a falls risk at this time. He voices noticing improved movement and posture with walking and transfers since being on the carbidopa-levodopa medication. However, he feels that his tremors in his B hands are still very limiting. He continues to present at this time with bradykinesia, hypokinesia, dyskinesia, fatigue, and bilateral UE tremor (right greater than left). Gene will benefit from therapy services to maintain normal amplitude of movements, slow progression of the disease, and maintain his independence. He will benefit from participation in the [facility] Program to ensure improved balance and confidence with mobility, increased gait speed, and overall  "improvement in his functional mobility for improved quality of life and maintained independence. His goals for therapy are to improve his posture, exercise more and be more active, and to be able to dress independently and with ease.    03/15/2023: Gene tolerated therapy well today and was able to complete all maximal daily exercises with 25% cueing. He did have a fall this morning which caused some \"discombobulation\" today. He is demonstrating improved amplitude and speed of movement with all functional component and hierarchy tasks. Ongoing therapy services are beneficial to progress towards improved balance and improved amplitude of movement in all daily tasks.        Patient to return in 3 months, for in-person visit, 30 minutes.  The patient reports there has been no new medical issues or concerns and I refer the reader to his prior medical records for additional information.    Past psychiatric history:  Patient denies having any premorbid psychiatric diagnoses or treatments prior to the recent diagnosis of depression by his primary care physician.  The only psychotropic medication he has ever been on is citalopram at 20 mg a day which the patient is reporting has been somewhat helpful.  He is also has some as needed Ativan which has helped him with sleep.  He had a psychiatric hospitalization last month as described above.  He was determined stable and was discharged to home and referred to this clinic by his neurologist to assess the current psychiatric plan.    The patient has never had any hallucinations or delusions.  No history of any khadar.  No history of any actual suicide attempts and no prior history of any suicidality.  He has never had any OCD symptoms.    Chemical dependency history:  The patient does not use alcohol or other substances.  He denies he has ever had any chemical dependency issues.  He has never been to detox and has never had a DWI.    Family history:  He denies having any family " history of mental illness or chemical dependency issues.     Social history:  The patient was born and raised in a [place] town in [facility] [place] [facility].  He states he was the oldest of 5 children and he had only brothers.  He stated he felt a lot of pressure growing up by his father.  No history of any learning disabilities or behavioral problems when he was young.  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  He has been  twice.  He was  5 years ago in January.  As a Shinto he believes he is still  and states that last year he and his wife went to a [employer]  to bless their marriage.  He is seeking to have more control over his ex-wife's affairs as she has Alzheimer's disease and has been struggling for about 12 to 15 years with declining cognitive abilities.  She is apparently in a facility in [place].  He has no children with her but he has 3 daughters from a prior relationship.  He currently lives alone.    Review of systems:  Patient admits admits recent lack of appetite.  He was having trouble with sleep but that has improved.  He denies having any new neurologic symptoms.  He is being followed by neurology.  No other new medical issues or concerns.    Mental status exam:  The patient was seen in person.  He was slow but there was no obvious tremor.  No evidence of any pain or shortness of breath.  Speech: Soft spoken and monotone.  He did have a hearing aid.  Answers were consistent and appropriate.  Not pressured or rambling.  Thought content: No evidence of any hallucinations or delusions.  Affect:.  Somewhat flat and depressed.  No khadar or lability.  No irritability.  Thought process: Patient was slow but able to track and follow.  Not disorganized.  No racing thoughts.  Thought formation was fair but slow  Memory: Recent memory was fair.  He did need occasional prompts.  Further testing may be considered.  Remote  memory appeared grossly intact.  Insight: Fair  Judgment: Fair  Orientation: Grossly intact    Diagnoses:  Major depression, single episode, moderate to severe, without psychosis  Parkinson's disease  Generalized anxiety    Assessment:  The patient presents with history as described above with the recent emergency room visit for significant depression.  He was not actually suicidal but that leading thoughts of wishing he was dead.  That has improved.  There is no current suicidality.  No evidence of any psychosis or khadar.  The patient was sent here having recently been started by his primary care clinic on Celexa.  He believes that is been helpful.  He also has as needed Ativan.  He is here for a second opinion regarding the current treatment plan and I did spend some time talking with him about that including differential diagnoses and other treatment options.    Plan:  The patient has established with a psychologist which hopefully will be quite helpful for the patient.  He is working with his family on better communication with them regarding his wife's condition and the treatment plan.  I spent a lot of time talking with patient about how I felt it was in his wife's best interest that she continue with her current treatment plan and providers in [place] and he could try and offer support.  He would like to have additional access to communicate with his wife but he does not have the legal power to do that and we spent some time talking about that.    I will make no psychotropic medication changes at this time.  The patient will follow up to this clinic on a as needed basis.  I discussed with him the possibility of having his current provider further increase the Celexa or change antidepressants if necessary.  The patient will return to this clinic on an as needed basis.  He agrees to call with any questions or concerns.

## 2023-04-06 NOTE — LETTER
"    4/6/2023         RE: Bob Bor[address] [place]  [facility] [address]        Dear Colleague,    Thank you for referring your patient, Bob Norton, to the [facility]. Please see a copy of my visit note below.    Initial clinic intake note:    The patient was seen for an intake to this clinic on April 6, 2022.  The patient has a prior history of depression in the context of his wife's declining health as she has Alzheimer's disease.  He is being followed by Dr. Stewart for primary care and does not want to move from that clinic as he likes it there.  He is being followed for Parkinson's disease through our neurology clinic.  He states Dr. Stewart diagnosed him and has been treating him for depression and anxiety.  In January she started him on low-dose citalopram as well as as needed Ativan and that was helpful.  He and his wife are  and they do have other family members that are concerned about her declining cognitive ability.  The patient wanted to move her to [place] where he could be closer to her but the family has stepped in and felt that was not in her best interest.  The patient became upset and in February \"turned himself in\" to an emergency room in [facility] where he was admitted for a couple of days.  They apparently continued the medications.  The patient had previously been power of  but he no longer is and he believes that precipitated his decline.  He denies having any current thoughts of hurting himself or anybody else and admits that since his return home after his hospitalization he has had no such thoughts.  He denies having any significant premorbid history.  He has never had a suicide attempt.  Even during the recent crisis he had no plan and did not think he would follow through with it.  No symptoms of khadar.  No psychosis.  As stated above, he likes his current primary care doctor but was sent here by our neurology " clinic just to have additional input on the current treatment plan.    HPI:  The patient presents at this time with history as described above.  Currently he reports he is doing better.  He is frustrated by his current circumstances as described above.  He has no access to firearms and is able to contract for his safety.  He states he feels guilty because his wife is living so far away from him and he feels as if he let her down.  They were  a number of years ago but he states they recently had their marriage blessed.  The patient does understand that perhaps it is best for his wife to stay in [place] at her current placement and she has children there that are supporting the patient but he does not feel they are spending enough time with her so he feels guilty.  The patient denies having any hallucinations or delusions.  No new medical issues or concerns.  No side effects to the medication.  The patient has been set up with psychology support and states he has been to a couple of visits and that is going well.  He is tolerating the medication and there is been no new medical issues or concerns.    On my interview with the patient today he denies having any suicidality.  No psychosis and no khadar.  He is sleeping better with use of the Ativan at night and I did talk with him about long-term use of that medic patient and the potential negative effects.  At this time it seems to be helpful.  He has recently started up with a therapist and states he will follow through with that.  The patient is medically stable and being followed as described above.  At this time I did spend quite a bit of time talking with him about differential diagnoses and some of the psychosocial stressors he is under but I agree with the current treatment plan.      Allergies   Allergen Reactions     Metronidazole Hives and Rash       Other reaction(s): SKIN - rash  Was taking this medication at the same time as sulfameth/trimethoprim,  had a reaction when he finished both prescriptions.        Sulfamethoxazole-Trimethoprim Rash and Hives       Was taking this with metronidazole, and when finished with the prescription, broke out in hives.     Codeine Nausea and Vomiting     Halogenated Anesthetics         Other reaction(s): GENERAL - sleep disorders     Hydrocodone-Acetaminophen Nausea and Vomiting and Nausea       Other reaction(s): Intolerance  Vomiting        Lisinopril Cough     Morphine Nausea and Vomiting     Venlafaxine         Other reaction(s): OTHER (explain in comments), UNKNOWN  CNS  CNS         Past medical history:    Please see the chart for full medical details.  Recent, relevant records are as described below.    A recent neurology referral note is as follows:  Impression:  Bob Norton is a 72 year old male with Parkinson Disease. The patient's main concern today is left hand movements, tremors, bradykinesia, poor dexterity. He would like to improve his left hand symptoms to write. Despite the above, he does observe improvement is other Parkinson Disease symptoms with starting carbidopa/levodopa such as walking, getting up from a seated position, stiffness in head/neck with improvement in rotating head and neck.      Parkinson Disease: Symptom started 5 years ago (2017) with tremors in hands, right hand. Then about 5-6 months ago tremors progressed to left hand. Symptoms progressed to hypophonia, shuffling gait, masked facies, drooling.  Depression: Recently placed on a 48 hour hold. While inpatient, medication adjustments were made to improve mood.   Fatigue      Plan:   - Increase dose of carbidopa/levodopa to 1.5 tabs three times daily   - Follow with therapy  - Continue management of mood with Dr. Stewart.   - Referral to Psychiatry for mood management     A recent physical therapy note is as follows:   ASSESSMENT AND SUMMARY: Pramod is a 72 year old male who initially presented to physical therapy with bradykinesia,  hypokinesia, dyskinesia, fatigue, and bilateral UE tremor (right greater than left). Pramod felt at the time that these symptoms were minimally impacting his daily life. However, he did indicate that the weakness in his hands along with the tremor do affect his ability to use his hands like he would prefer. He completed the 5 Time Sit to Stand Test in 8.09 seconds, which is an improvement in his time from June of 2022, which was 16.63 seconds. He also completed the 3-Step TUG within norms for community living adults, and with slightly improved times compared to when assessed in June of 2022. He scored a 22/30 on the Functional Gait Assessment which indicated a slight increased falls risk within the next year. His past medical history is significant for Meniere's disease and Lupus, both of which could impact his symptoms from Parkinson's Disease. He was self-limiting his recreational activities (ie fly fishing) due to his UE tremor and other PD symptoms. He shared concerns on 11/05/2022 about being able to continue to live on his own; but his ABC Scale showed 98.44% confidence in his safety with mobility. He attended one additional visit in November to work on creating a home program that he could complete until the new year (2023) when he was ready to participate in [facility]. He presented for a follow up on 03/07/2023 and for his first [facility] session. Upon this visit, his 5 Time Sit to Stand and TUG tests were within 0.5-1 second of their time on 11/04/2022. He did show improvement in his FGA score, with a 25/30, indicating less of a falls risk at this time. He voices noticing improved movement and posture with walking and transfers since being on the carbidopa-levodopa medication. However, he feels that his tremors in his B hands are still very limiting. He continues to present at this time with bradykinesia, hypokinesia, dyskinesia, fatigue, and bilateral UE tremor (right greater than left). Pramod will benefit from  "therapy services to maintain normal amplitude of movements, slow progression of the disease, and maintain his independence. He will benefit from participation in the [facility] Program to ensure improved balance and confidence with mobility, increased gait speed, and overall improvement in his functional mobility for improved quality of life and maintained independence. His goals for therapy are to improve his posture, exercise more and be more active, and to be able to dress independently and with ease.    03/15/2023: Gene tolerated therapy well today and was able to complete all maximal daily exercises with 25% cueing. He did have a fall this morning which caused some \"discombobulation\" today. He is demonstrating improved amplitude and speed of movement with all functional component and hierarchy tasks. Ongoing therapy services are beneficial to progress towards improved balance and improved amplitude of movement in all daily tasks.        Patient to return in 3 months, for in-person visit, 30 minutes.  The patient reports there has been no new medical issues or concerns and I refer the reader to his prior medical records for additional information.    Past psychiatric history:  Patient denies having any premorbid psychiatric diagnoses or treatments prior to the recent diagnosis of depression by his primary care physician.  The only psychotropic medication he has ever been on is citalopram at 20 mg a day which the patient is reporting has been somewhat helpful.  He is also has some as needed Ativan which has helped him with sleep.  He had a psychiatric hospitalization last month as described above.  He was determined stable and was discharged to home and referred to this clinic by his neurologist to assess the current psychiatric plan.    The patient has never had any hallucinations or delusions.  No history of any khadar.  No history of any actual suicide attempts and no prior history of any suicidality.  He has " never had any OCD symptoms.    Chemical dependency history:  The patient does not use alcohol or other substances.  He denies he has ever had any chemical dependency issues.  He has never been to detox and has never had a DWI.    Family history:  He denies having any family history of mental illness or chemical dependency issues.     Social history:  The patient was born and [place] in [place] town in [facility].  He states he was the oldest of 5 children and he had only brothers.  He stated he felt a lot of pressure growing up by his father.  No history of any learning disabilities or behavioral problems when he was young.  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  He has been  twice.  He was  5 years ago in January.  As a Yazidism he believes he is still  and states that last year he and his wife went to a [place]  to bless their marriage.  He is seeking to have more control over his ex-wife's affairs as she has Alzheimer's disease and has been struggling for about 12 to 15 years with declining cognitive abilities.  She is apparently in a facility in [place].  He has no children with her but he has 3 daughters from a prior relationship.  He currently lives alone.    Review of systems:  Patient admits admits recent lack of appetite.  He was having trouble with sleep but that has improved.  He denies having any new neurologic symptoms.  He is being followed by neurology.  No other new medical issues or concerns.    Mental status exam:  The patient was seen in person.  He was slow but there was no obvious tremor.  No evidence of any pain or shortness of breath.  Speech: Soft spoken and monotone.  He did have a hearing aid.  Answers were consistent and appropriate.  Not pressured or rambling.  Thought content: No evidence of any hallucinations or delusions.  Affect:.  Somewhat flat and depressed.  No khadar or  lability.  No irritability.  Thought process: Patient was slow but able to track and follow.  Not disorganized.  No racing thoughts.  Thought formation was fair but slow  Memory: Recent memory was fair.  He did need occasional prompts.  Further testing may be considered.  Remote memory appeared grossly intact.  Insight: Fair  Judgment: Fair  Orientation: Grossly intact    Diagnoses:  Major depression, single episode, moderate to severe, without psychosis  Parkinson's disease  Generalized anxiety    Assessment:  The patient presents with history as described above with the recent emergency room visit for significant depression.  He was not actually suicidal but that leading thoughts of wishing he was dead.  That has improved.  There is no current suicidality.  No evidence of any psychosis or khadar.  The patient was sent here having recently been started by his primary care clinic on Celexa.  He believes that is been helpful.  He also has as needed Ativan.  He is here for a second opinion regarding the current treatment plan and I did spend some time talking with him about that including differential diagnoses and other treatment options.    Plan:  The patient has established with a psychologist which hopefully will be quite helpful for the patient.  He is working with his family on better communication with them regarding his wife's condition and the treatment plan.  I spent a lot of time talking with patient about how I felt it was in his wife's best interest that she continue with her current treatment plan and providers in [place] and he could try and offer support.  He would like to have additional access to communicate with his wife but he does not have the legal power to do that and we spent some time talking about that.    I will make no psychotropic medication changes at this time.  The patient will follow up to this clinic on a as needed basis.  I discussed with him the possibility of having his current  provider further increase the Celexa or change antidepressants if necessary.  The patient will return to this clinic on an as needed basis.  He agrees to call with any questions or concerns.      Again, thank you for allowing me to participate in the care of your patient.        Sincerely,        Amauri Rocha MD

## 2023-04-06 NOTE — NURSING NOTE
Chief Complaint   Patient presents with     Depression     Referred by SHAWN Leiva on 4/6/2023 at 10:54 AM

## 2023-08-13 ENCOUNTER — HEALTH MAINTENANCE LETTER (OUTPATIENT)
Age: 73
End: 2023-08-13

## 2023-09-13 ENCOUNTER — OFFICE VISIT (OUTPATIENT)
Dept: NEUROLOGY | Facility: CLINIC | Age: 73
End: 2023-09-13
Payer: MEDICARE

## 2023-09-13 VITALS — HEART RATE: 58 BPM | DIASTOLIC BLOOD PRESSURE: 63 MMHG | SYSTOLIC BLOOD PRESSURE: 120 MMHG

## 2023-09-13 DIAGNOSIS — G20.A1 PARKINSON DISEASE (H): Primary | ICD-10-CM

## 2023-09-13 DIAGNOSIS — G47.10 HYPERSOMNOLENCE: ICD-10-CM

## 2023-09-13 DIAGNOSIS — K11.7 SIALORRHEA: ICD-10-CM

## 2023-09-13 PROCEDURE — 99215 OFFICE O/P EST HI 40 MIN: CPT | Mod: GC | Performed by: PSYCHIATRY & NEUROLOGY

## 2023-09-13 NOTE — PROGRESS NOTES
"Department of Neurology  Movement Disorders Division      Patient: Bob Norton   MRN: [id number]   : 1950   Date of Visit: 2023     History of Present Illness  Mr. Norton is a 72 year old male that presents to Neurology Movement clinic for follow up regarding Parkinson Disease management. Patient was last seen in clinic 3/1/2023 - plan was to increase sinemet.     History obtained from patient. Patient is accompanied by daughter, Kimberley (ER physician).    After increasing Sinemet, he has noticed significant improvements: his handwriting and gait have improved. He is able to walk much more fluid and upright. His speech has also improved, speaking louder. Now is able to eat soup again without spilling.    Patient feels that after increasing the sinemet, he has had increased somnolence. He now sleeps at 9-10PM and wakes up at 9AM (averaging 10-12 hours of sleep). This is about 1 hour more sleep than his baseline. No GI side effects. No orthostatic symptoms. No wearing off symptoms.    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 No choking episodes or drooling.    Patient is working with physical therapy and is in the process of getting set up with speech therapy.    Uses 1 tablet of ativan at night (this has not been a change since the last visit). This is for anxiety. He no longer     Patient states his PCP requested that he inquire about neuropsych testing.     Recently moved to an independent unit of an assisted living facility.     Tremors: No improvement with current dose of carbidopa/levodopa. Tremors improve later in the day at times.   Drooling: no issues  Fatigue: increased from last visit. No hallucinations  Anosmia: yes for several years   Constipation: not typically; for the most part daily  Balance: No significant changes   Falls: had one recent mechanical fall in setting of wearing an old pair of slippers (slipper " "stuck to the floor). Did not hit his head, only bumped elbow.   Mood: up and down, especially pertaining thoughts about his former wife who is in a memory care facility in [place] (1 / month). No suicidal ideation.  Memory: about the same as last visit  Sleep: no difficulties falling asleep or hallucinations    Notably, 2/2023, patient was in a 72 hour hold as he \"couldn't take it anymore.\" Support system were all out of town. Currently taking citalopram 10 mg and ativan 0.5 mg. Citalopram increased while in the hospital.     Movement Disorder-related Medications                   Breakfast Lunch Dinner       Carbidopa/levodopa IR 25/100 mg  1.5 1.5 1.5                                                 Last taken at 7:15AM  Above schedule fluctuates based on the day.      Review of Systems:  Other than that mentioned above, the remainder of 12 systems reviewed were negative.     PMH: unchanged  PSH: unchanged  FH: unchanged  SH: unchanged     Medications:  Current Outpatient Prescriptions          Current Outpatient Medications   Medication Sig Dispense Refill    acetaminophen (TYLENOL) 500 MG tablet Take 500-1,000 mg by mouth At Bedtime        alfuzosin ER (UROXATRAL) 10 MG 24 hr tablet Take 10 mg by mouth daily        atorvastatin (LIPITOR) 40 MG tablet Take 40 mg by mouth daily        carbidopa-levodopa (SINEMET)  MG tablet Week 1 take 0.5 tabs three times daily. Week 2 take 1 tab three times daily. Stop at lowest effective dose. Take medication an hour before or after a protein rich brenda. 90 tablet 11    citalopram (CELEXA) 10 MG tablet          famotidine (PEPCID) 20 MG tablet Take 20 mg by mouth 2 times daily        gabapentin (NEURONTIN) 300 MG capsule Take 1 capsule by mouth At Bedtime        hydrochlorothiazide (HYDRODIURIL) 25 MG tablet Take 25 mg by mouth daily as needed        LORazepam (ATIVAN) 0.5 MG tablet          LORazepam (ATIVAN) 1 MG tablet Take 1 mg by mouth 2 times daily as needed        " meclizine (ANTIVERT) 25 MG tablet Take 25 mg by mouth 2 times daily as needed        potassium chloride ER (K-TAB/KLOR-CON) 10 MEQ CR tablet Take 40 mEq by mouth daily        pyridOXINE (VITAMIN B6) 100 MG TABS Take 100 mg by mouth daily        topiramate (TOPAMAX) 25 MG tablet Take 25 mg by mouth 2 times daily        triamterene-HCTZ (MAXZIDE) 75-50 MG tablet Take 0.5 tablets by mouth        vitamin B-12 (CYANOCOBALAMIN) 1000 MCG tablet Take 1 tablet by mouth daily        warfarin ANTICOAGULANT (COUMADIN) 5 MG tablet Take by mouth 1 tab (5mg) on Mon, Wed, Fri and 0.5 tab (2.5mg) all other days OR as directed by INR nurse.        topiramate (TOPAMAX) 25 MG tablet Take 1 tablet by mouth 2 times daily (Patient not taking: Reported on 3/1/2023)        warfarin ANTICOAGULANT (COUMADIN) 5 MG tablet Take 5 mg by mouth daily 5mg//MWF  2.5 mg//STTS (Patient not taking: Reported on 3/1/2023)                         Allergies   Allergen Reactions    Metronidazole Hives and Rash       Other reaction(s): SKIN - rash  Was taking this medication at the same time as sulfameth/trimethoprim, had a reaction when he finished both prescriptions.       Sulfamethoxazole-Trimethoprim Rash and Hives       Was taking this with metronidazole, and when finished with the prescription, broke out in hives.    Codeine Nausea and Vomiting    Halogenated Anesthetics         Other reaction(s): GENERAL - sleep disorders    Hydrocodone-Acetaminophen Nausea and Vomiting and Nausea       Other reaction(s): Intolerance  Vomiting       Lisinopril Cough    Morphine Nausea and Vomiting    Venlafaxine         Other reaction(s): OTHER (explain in comments), UNKNOWN  CNS  CNS               Physical Exam:  Physical Examination   Vitals: There were no vitals taken for this visit.  General: Mild hypophonia  Head: Normocephalic/atraumatic  Eyes: no icterus  Pulmonary: non-labored, breathing comfortably on room air  GI: non-distended  Skin: No rash or lesion on  exposed skin  Psych: Mood pleasant, affect congruent    Neuro:  Mental status: Awake, alert, attentive, oriented to self, time, place, and circumstance. Conversed appropriately. Slowed responses.   Cranial nerves: Pupils equal, conjugate gaze, EOMI, facial sensation intact to light touch, face symmetric, shoulder shrug strong, tongue midline, no dysarthria.   Motor: Mild rigidity in L arm and leg, worse with distraction. R hand resting tremor. Mild L hand postural tremor (wing flapping). Moves all extremities antigravity.  Reflexes: Normo-reflexic and symmetric biceps, brachioradialis, patellae. Absent in achilles.   Sensory: Intact to light touch, pin in all 4 extremities   Coordination: Decreased speed with finger tapping (R > L) and foot tapping (R > L) in all extremities.   Gait: Able to stand up with arms crossed independently. Decreased R arm swing. No shuffling gait on exam today. At baseline does not use an assistive device.     Impression:  Bob Norton is a 72 year old L handed male with Parkinson's Disease. He has noticed improvements after increasing Sinemet (improved handwriting, fluidity and speed of gait, ability to walk farther distances, speaking louder) and is happy with his current dose. Today, we addressed multiple concerns including hypersomnolence, sialorrhea, and answered questions about neuropsych testing.    Regarding hypersomnolence (~1 hour more than baseline), I encouraged patient to consider touching base with Dr. Stewart regarding the ability to decrease his ativan at night (he currently takes 0.5 mg ativan every evening before he goes to sleep). As he has had such significant improvements in his motor symptoms with the increased dose of Sinemet, I would first try to address other medications that could be contributing to hypersomnolence before going back down on his Sinemet. Although patient agreed that he's happy with his current dose of Sinemet, he's not sure if he's open to  "decreasing ativan. Discussed that there's no rush in making a decision but that would be the medication I would address first if he feels the hypersomnolence is bothersome enough to affect his quality of life.    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 Discussed this is likely a sequelae of his Parkinson's disease. It's not particularly affecting his quality of life at this point but discussed that botox could be an option in the future.    Regarding neuropsych testing, discussed that this would be reasonable if patient wanted to obtain a baseline exam. However, patient is currently not interested. Feel it's reasonable to hold off, especially as both daughter and patient are not concerned about memory loss at this time.     Parkinson Disease: Symptom started 5 years ago (2017) with tremors in hands, right hand. Then about 5-6 months ago tremors progressed to left hand. Symptoms progressed to hypophonia, shuffling gait, masked facies, drooling.  Depression: Recently placed on a 48 hour hold. While inpatient, medication adjustments were made to improve mood.       Plan:   - Continue carbidopa/levodopa 25/100, 1.5 tabs three times daily   - Hold off on neuropsychiatry testing for now   - Encouraged patient to discuss with Dr. Stewart regarding ability to decrease ativan (even by half a dose) to improve hypersomnolence   - Dicussed we could consider botox in the future if patient's hypersalivation starts to affect his quality of life  - Continue physical therapy and speech therapy (once speech therapy is established)     Patient to return in 6 months, for in-person visit, 30 minutes.     Leonarda Penny MD  PGY-3 Neurology Resident    Patient was seen and discussed with neurology attending, Dr. Desouza  "

## 2023-09-13 NOTE — PATIENT INSTRUCTIONS
- Continue carbidopa/levodopa 25/100, 1.5 tabs three times daily   - Hold off on neuropsychiatry testing for now   - Encouraged patient to discuss with Dr. Stewart regarding ability to decrease ativan (even by half a dose) to improve excessive sleepiness  - Dicussed we could consider botox in the future if increased mucous production starts to affect his quality of life  - Continue physical therapy and speech therapy (once speech therapy is established)

## 2023-09-13 NOTE — NURSING NOTE
Chief Complaint   Patient presents with    Follow Up     parkinson       SHAWN Parish on 9/13/2023 at 9:07 AM

## 2023-09-13 NOTE — LETTER
"    2023         RE: Bob Norton  [address]  [address]  [facility] [address]        Dear Colleague,    Thank you for referring your patient, Bob Norton, to the [facility]UROLOG[facility]. Please see a copy of my visit note below.    Department of Neurology  Movement Disorders Division      Patient: Bob Norton   MRN: [id number]   : 1950   Date of Visit: 2023     History of Present Illness  Mr. Norton is a 72 year old male that presents to Neurology Movement clinic for follow up regarding Parkinson Disease management. Patient was last seen in clinic 3/1/2023 - plan was to increase sinemet.     History obtained from patient. Patient is accompanied by daughter, Kimberley (ER physician).    After increasing Sinemet, he has noticed significant improvements: his handwriting and gait have improved. He is able to walk much more fluid and upright. His speech has also improved, speaking louder. Now is able to eat soup again without spilling.    Patient feels that after increasing the sinemet, he has had increased somnolence. He now sleeps at 9-10PM and wakes up at 9AM (averaging 10-12 hours of sleep). This is about 1 hour more sleep than his baseline. No GI side effects. No orthostatic symptoms. No wearing off symptoms.    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 No choking episodes or drooling.    Patient is working with physical therapy and is in the process of getting set up with speech therapy.    Uses 1 tablet of ativan at night (this has not been a change since the last visit). This is for anxiety. He no longer     Patient states his PCP requested that he inquire about neuropsych testing.     Recently moved to an independent unit of an assisted living facility.     Tremors: No improvement with current dose of carbidopa/levodopa. Tremors improve later in the day at times.   Drooling: " "no issues  Fatigue: increased from last visit. No hallucinations  Anosmia: yes for several years   Constipation: not typically; for the most part daily  Balance: No significant changes   Falls: had one recent mechanical fall in setting of wearing an old pair of slippers (slipper stuck to the floor). Did not hit his head, only bumped elbow.   Mood: up and down, especially pertaining thoughts about his former wife who is in a memory care facility in [place] (1 / month). No suicidal ideation.  Memory: about the same as last visit  Sleep: no difficulties falling asleep or hallucinations    Notably, 2/2023, patient was in a 72 hour hold as he \"couldn't take it anymore.\" Support system were all out of town. Currently taking citalopram 10 mg and ativan 0.5 mg. Citalopram increased while in the hospital.     Movement Disorder-related Medications                   Breakfast Lunch Dinner       Carbidopa/levodopa IR 25/100 mg  1.5 1.5 1.5                                                 Last taken at 7:15AM  Above schedule fluctuates based on the day.      Review of Systems:  Other than that mentioned above, the remainder of 12 systems reviewed were negative.     PMH: unchanged  PSH: unchanged  FH: unchanged  SH: unchanged     Medications:  Current Outpatient Prescriptions          Current Outpatient Medications   Medication Sig Dispense Refill     acetaminophen (TYLENOL) 500 MG tablet Take 500-1,000 mg by mouth At Bedtime         alfuzosin ER (UROXATRAL) 10 MG 24 hr tablet Take 10 mg by mouth daily         atorvastatin (LIPITOR) 40 MG tablet Take 40 mg by mouth daily         carbidopa-levodopa (SINEMET)  MG tablet Week 1 take 0.5 tabs three times daily. Week 2 take 1 tab three times daily. Stop at lowest effective dose. Take medication an hour before or after a protein rich brenda. 90 tablet 11     citalopram (CELEXA) 10 MG tablet           famotidine (PEPCID) 20 MG tablet Take 20 mg by mouth 2 times daily         " gabapentin (NEURONTIN) 300 MG capsule Take 1 capsule by mouth At Bedtime         hydrochlorothiazide (HYDRODIURIL) 25 MG tablet Take 25 mg by mouth daily as needed         LORazepam (ATIVAN) 0.5 MG tablet           LORazepam (ATIVAN) 1 MG tablet Take 1 mg by mouth 2 times daily as needed         meclizine (ANTIVERT) 25 MG tablet Take 25 mg by mouth 2 times daily as needed         potassium chloride ER (K-TAB/KLOR-CON) 10 MEQ CR tablet Take 40 mEq by mouth daily         pyridOXINE (VITAMIN B6) 100 MG TABS Take 100 mg by mouth daily         topiramate (TOPAMAX) 25 MG tablet Take 25 mg by mouth 2 times daily         triamterene-HCTZ (MAXZIDE) 75-50 MG tablet Take 0.5 tablets by mouth         vitamin B-12 (CYANOCOBALAMIN) 1000 MCG tablet Take 1 tablet by mouth daily         warfarin ANTICOAGULANT (COUMADIN) 5 MG tablet Take by mouth 1 tab (5mg) on Mon, Wed, Fri and 0.5 tab (2.5mg) all other days OR as directed by INR nurse.         topiramate (TOPAMAX) 25 MG tablet Take 1 tablet by mouth 2 times daily (Patient not taking: Reported on 3/1/2023)         warfarin ANTICOAGULANT (COUMADIN) 5 MG tablet Take 5 mg by mouth daily 5mg//MWF  2.5 mg//STTS (Patient not taking: Reported on 3/1/2023)                         Allergies   Allergen Reactions     Metronidazole Hives and Rash       Other reaction(s): SKIN - rash  Was taking this medication at the same time as sulfameth/trimethoprim, had a reaction when he finished both prescriptions.        Sulfamethoxazole-Trimethoprim Rash and Hives       Was taking this with metronidazole, and when finished with the prescription, broke out in hives.     Codeine Nausea and Vomiting     Halogenated Anesthetics         Other reaction(s): GENERAL - sleep disorders     Hydrocodone-Acetaminophen Nausea and Vomiting and Nausea       Other reaction(s): Intolerance  Vomiting        Lisinopril Cough     Morphine Nausea and Vomiting     Venlafaxine         Other reaction(s): OTHER (explain in  comments), UNKNOWN  CNS  CNS               Physical Exam:  Physical Examination   Vitals: There were no vitals taken for this visit.  General: Mild hypophonia  Head: Normocephalic/atraumatic  Eyes: no icterus  Pulmonary: non-labored, breathing comfortably on room air  GI: non-distended  Skin: No rash or lesion on exposed skin  Psych: Mood pleasant, affect congruent    Neuro:  Mental status: Awake, alert, attentive, oriented to self, time, place, and circumstance. Conversed appropriately. Slowed responses.   Cranial nerves: Pupils equal, conjugate gaze, EOMI, facial sensation intact to light touch, face symmetric, shoulder shrug strong, tongue midline, no dysarthria.   Motor: Mild rigidity in L arm and leg, worse with distraction. R hand resting tremor. Mild L hand postural tremor (wing flapping). Moves all extremities antigravity.  Reflexes: Normo-reflexic and symmetric biceps, brachioradialis, patellae. Absent in achilles.   Sensory: Intact to light touch, pin in all 4 extremities   Coordination: Decreased speed with finger tapping (R > L) and foot tapping (R > L) in all extremities.   Gait: Able to stand up with arms crossed independently. Decreased R arm swing. No shuffling gait on exam today. At baseline does not use an assistive device.     Impression:  Bob Norton is a 72 year old L handed male with Parkinson's Disease. He has noticed improvements after increasing Sinemet (improved handwriting, fluidity and speed of gait, ability to walk farther distances, speaking louder) and is happy with his current dose. Today, we addressed multiple concerns including hypersomnolence, sialorrhea, and answered questions about neuropsych testing.    Regarding hypersomnolence (~1 hour more than baseline), I encouraged patient to consider touching base with Dr. Stewart regarding the ability to decrease his ativan at night (he currently takes 0.5 mg ativan every evening before he goes to sleep). As he has had such  "significant improvements in his motor symptoms with the increased dose of Sinemet, I would first try to address other medications that could be contributing to hypersomnolence before going back down on his Sinemet. Although patient agreed that he's happy with his current dose of Sinemet, he's not sure if he's open to decreasing ativan. Discussed that there's no rush in making a decision but that would be the medication I would address first if he feels the hypersomnolence is bothersome enough to affect his quality of life.    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 Discussed this is likely a sequelae of his Parkinson's disease. It's not particularly affecting his quality of life at this point but discussed that botox could be an option in the future.    Regarding neuropsych testing, discussed that this would be reasonable if patient wanted to obtain a baseline exam. However, patient is currently not interested. Feel it's reasonable to hold off, especially as both daughter and patient are not concerned about memory loss at this time.     Parkinson Disease: Symptom started 5 years ago (2017) with tremors in hands, right hand. Then about 5-6 months ago tremors progressed to left hand. Symptoms progressed to hypophonia, shuffling gait, masked facies, drooling.  Depression: Recently placed on a 48 hour hold. While inpatient, medication adjustments were made to improve mood.       Plan:   - Continue carbidopa/levodopa 25/100, 1.5 tabs three times daily   - Hold off on neuropsychiatry testing for now   - Encouraged patient to discuss with Dr. Stewart regarding ability to decrease ativan (even by half a dose) to improve hypersomnolence   - Dicussed we could consider botox in the future if patient's hypersalivation starts to affect his quality of life  - Continue physical therapy and speech therapy (once speech therapy is established)     Patient to return in 6 months, for in-person visit, " 30 minutes.     Leonarda Penny MD  PGY-3 Neurology Resident    Patient was seen and discussed with neurology attending, Dr. Desouza    Attestation signed by Santa Desouza DO at 9/13/2023  3:47 PM:    I, Santa Desouza DO, personally saw this patient with the Neurology resident and agree with Dr. Penny's findings and plan of care as documented in Dr. Penny's note. I personally performed/observed salient aspects of the history and neurological examination.     I personally reviewed the medications and labs. I personally viewed the imaging, and agree with the interpretation documented by the resident.    Date of Service (when I saw the patient): 09/13/23    Time spent with patient: 30 minutes  40 minutes spent on date of encounter doing chart reviews and exam and documentation and further activities as noted above.     Santa Desouza DO, MA   of Neurology   [facility]             Again, thank you for allowing me to participate in the care of your patient.        Sincerely,        Santa Desouza DO

## 2024-03-06 ENCOUNTER — OFFICE VISIT (OUTPATIENT)
Dept: NEUROLOGY | Facility: CLINIC | Age: 74
End: 2024-03-06
Payer: MEDICARE

## 2024-03-06 VITALS — DIASTOLIC BLOOD PRESSURE: 67 MMHG | SYSTOLIC BLOOD PRESSURE: 123 MMHG | HEART RATE: 62 BPM

## 2024-03-06 DIAGNOSIS — G20.A2 PARKINSON'S DISEASE WITHOUT DYSKINESIA, WITH FLUCTUATING MANIFESTATIONS (H): Primary | ICD-10-CM

## 2024-03-06 PROCEDURE — 99215 OFFICE O/P EST HI 40 MIN: CPT | Performed by: PSYCHIATRY & NEUROLOGY

## 2024-03-06 PROCEDURE — G2211 COMPLEX E/M VISIT ADD ON: HCPCS | Performed by: PSYCHIATRY & NEUROLOGY

## 2024-03-06 RX ORDER — ALFUZOSIN HYDROCHLORIDE 10 MG/1
10 TABLET, EXTENDED RELEASE ORAL DAILY
COMMUNITY
Start: 2023-06-20 | End: 2024-06-19

## 2024-03-06 ASSESSMENT — UNIFIED PARKINSONS DISEASE RATING SCALE (UPDRS)
FINGER_TAPPING_LEFT: (0) NORMAL: NO PROBLEMS.
PRONATION_SUPINATION_RIGHT: (1) SLIGHT: ANY OF THE FOLLOWING: A) THE REGULAR RHYTHM IS BROKEN WITH ONE WITH ONE OR TWO INTERRUPTIONS OR HESITATIONS OF THE MOVEMENT  B) SLIGHT SLOWING  C) THE AMPLITUDE DECREMENTS NEAR THE END OF THE 10 MOVEMENTS.
GAIT: (0) NORMAL: NO PROBLEMS.
HANDMOVEMENTS_LEFT: (0) NORMAL: NO PROBLEMS.
AMPLITUDE_LUE: (0) NORMAL: NO TREMOR.
LEG_AGILITY_LEFT: (0) NORMAL: NO PROBLEMS.
HANDMOVEMENTS_RIGHT: (0) NORMAL: NO PROBLEMS.
SPEECH: (1) SLIGHT: LOSS OF MODULATION, DICTION OR VOLUME, BUT STILL ALL WORDS EASY TO UNDERSTAND.
PARKINSONS_MEDS: ON
ARISING_CHAIR: (0) NORMAL: NO PROBLEMS. ABLE TO ARISE QUICKLY WITHOUT HESITATION.
PRONATION_SUPINATION_LEFT: (1) SLIGHT: ANY OF THE FOLLOWING: A) THE REGULAR RHYTHM IS BROKEN WITH ONE WITH ONE OR TWO INTERRUPTIONS OR HESITATIONS OF THE MOVEMENT  B) SLIGHT SLOWING  C) THE AMPLITUDE DECREMENTS NEAR THE END OF THE 10 MOVEMENTS.
POSTURE: (0) NORMAL: NO PROBLEMS.
FACIAL_EXPRESSION: (1) SLIGHT: MINIMAL MASKED FACIES MANIFESTED ONLY BY DECREASED FREQUENCY OF BLINKING.
AMPLITUDE_LIP_JAW: (0) NORMAL: NO TREMOR.
FINGER_TAPPING_RIGHT: (0) NORMAL: NO PROBLEMS.
TOETAPPING_RIGHT: (0) NORMAL: NO PROBLEMS.
FREEZING_GAIT: (0) NORMAL: NO FREEZING.
AMPLITUDE_LLE: (0) NORMAL: NO TREMOR.
AMPLITUDE_RLE: (0) NORMAL: NO TREMOR.
AMPLITUDE_RUE: (0) NORMAL: NO TREMOR.
LEG_AGILITY_RIGHT: (0) NORMAL: NO PROBLEMS.
DYSKINESIAS_PRESENT: NO
TOETAPPING_LEFT: (0) NORMAL: NO PROBLEMS.
SPONTANEITY_OF_MOVEMENT: (1) SLIGHT: SLIGHT GLOBAL SLOWNESS AND POVERTY OF SPONTANEOUS MOVEMENTS.

## 2024-03-06 NOTE — PROGRESS NOTES
Department of Neurology  Movement Disorders Division     Patient: Bob Norton   MRN: [id number]   : 1950   Date of Visit: 2024    Impression:  Bob Norton is a 73 year old male with Parkinson Disease. The patient's main concern today is wearing off.    Parkinson Disease: Symptom started 2017 with tremors in hands, right hand. Then tremors progressed to left hand. Symptoms progressed to hypophonia, shuffling gait, masked facies, drooling. He was diagnosed with Parkinson Disease in 10/2022 and started on carbidopa/levodopa IR at this time with improvement in handwriting, fluidity and speed of gait, ability to walk farther distances, speaking louder.  Depression: Previously placed on a 48 hour hold, and while inpatient, medication adjustments were made to improve mood.     Plan:   - To improve wearing off, start carbidopa/levodopa CR 50/200 mg 1 tab at bedtime   - No changes to carbidopa/levodopa IR   Movement Disorder-related Medications                   Indication  8-9 AM noon 330 PM  at bedtime      Carbidopa/levodopa IR 25/100 mg  PD 1.5 tabs 1.5 1.5       Carbidopa/levodopa CR 50/200 mg PD        1                    - Avoid protein 30 mins before or after taking carbidopa/levodopa IR. If you notice that Parkinson Disease symptoms are not as well controlled, go back to avoiding protein about an hour before or after taking carbidopa/levodopa IR.   - Stay hydrated with 6-8 glasses of 6-8 ounces of water daily. Stop drinking fluids 1 hour prior to bed.   - Continue regular audiology visits  - Provided Parkinson Disease exercise resources     Patient to return in 6 months, for in-person visit, 30 minutes.     Santa Desouza DO  Movement Disorders Specialist  [facility]     Note dictated using voice recognition software.   46 minutes spent on date of encounter doing chart reviews and exam and documentation and further activities as noted above.         ------------------------------------------------------------------------------------------------------------      History of Present Illness  Mr. Norton is a pleasant 73 year old male that presents to Neurology Movement clinic for follow up regarding Parkinson Disease management.   The patient was initially seen in neurologic consultation on 10/26/22 and most recently 9/2023 for management of Parkinson Disease.  At this visit, no changes were made to patient's carbidopa/levodopa IR medication regiment.  We discussed neuropsychiatric testing for which we decided to hold off at that time.  Regarding hypersomnolence, he was encouraged to discuss with Dr. Stewart to decrease the dose of Ativan for which she is taking.  He was encouraged to do daily and safe exercise.  We discussed the possibility of neurotoxin injections if sialorrhea remains an issue.  Please see the comprehensive neurologic consultation notes from those dates in the Epic records for details.      History obtained from patient. Patient is accompanied by daughter, Kimberley (ER physician).  Main complaint: wearing off  Patient reports working with Physical Therapy currently and has shown improvement.   Wearing off: denies  Balance/falls: He last injured himself around [facility] after getting stuck between a closing automatic door after turning around quickly. He suffered a cut on his forehead. Another time, he was reading for an hour and after getting up he felt lightheaded and took a few steps and fell on his rear. Drinks 2-3 bottles daily.   Sleep: Bed by 10 pm. He may have a hard time falling asleep. Sometimes it may be worrying about his ex-wife.   Takes maxzide and topapamax to treat Meniere's disease.     Movement Disorder-related Medications                   8-9 am noon 330 pm       Carbidopa/levodopa IR 25/100 mg  1.5 1.5 1.5                                                    Review of Systems:  Other than that mentioned above, the  remainder of 12 systems reviewed were negative.    PMH: unchanged  PSH: unchanged  FH: unchanged  SH: unchanged    Medications:  Current Outpatient Medications   Medication Sig Dispense Refill    acetaminophen (TYLENOL) 500 MG tablet Take 1,000 mg by mouth At Bedtime      alfuzosin ER (UROXATRAL) 10 MG 24 hr tablet Take 10 mg by mouth daily      atorvastatin (LIPITOR) 40 MG tablet Take 40 mg by mouth daily      carbidopa-levodopa (SINEMET)  MG tablet Take 1.5 tablets by mouth 3 times daily Week 1 take 0.5 tabs three times daily. Week 2 take 1 tab three times daily. Stop at lowest effective dose. Take medication an hour before or after a protein rich brenda. (Patient taking differently: Take 1.5 tablets by mouth 3 times daily Take medication an hour before or after a protein rich brenda.) 405 tablet 3    citalopram (CELEXA) 20 MG tablet Take 20 mg by mouth daily      famotidine (PEPCID) 20 MG tablet Take 20 mg by mouth 2 times daily      gabapentin (NEURONTIN) 300 MG capsule Take 1 capsule by mouth At Bedtime      hydrochlorothiazide (HYDRODIURIL) 25 MG tablet Take 25 mg by mouth daily as needed      LORazepam (ATIVAN) 0.5 MG tablet TAKE 1/2 TO 1 TABLET BY MOUTH EVERY 8 HOURS PRN FOR ANXIETY      meclizine (ANTIVERT) 25 MG tablet Take 25 mg by mouth 2 times daily as needed      potassium chloride ER (K-TAB) 20 MEQ CR tablet Take 20 mEq by mouth 2 times daily      topiramate (TOPAMAX) 25 MG tablet Take 25 mg by mouth 2 times daily      triamterene-HCTZ (MAXZIDE) 75-50 MG tablet Take 0.5 tablets by mouth      warfarin ANTICOAGULANT (COUMADIN) 5 MG tablet Take by mouth 1 tab (5mg) on Mon, Wed, Fri and 0.5 tab (2.5mg) all other days OR as directed by INR nurse.             Allergies   Allergen Reactions    Metronidazole Hives and Rash     Other reaction(s): SKIN - rash  Was taking this medication at the same time as sulfameth/trimethoprim, had a reaction when he finished both prescriptions.       "Sulfamethoxazole-Trimethoprim Rash and Hives     Was taking this with metronidazole, and when finished with the prescription, broke out in hives.    Anesthetics, Halogenated      Other reaction(s): GENERAL - sleep disorders    Codeine Nausea and Vomiting    Hydrocodone-Acetaminophen Nausea and Vomiting and Nausea     Other reaction(s): Intolerance  Vomiting      Lisinopril Cough    Morphine Nausea and Vomiting    Venlafaxine      Other reaction(s): OTHER (explain in comments), UNKNOWN  CNS  CNS            Physical Exam:  The patient's  blood pressure is 123/67 and his pulse is 62.        3/6/2024     2:00 PM   UPDRS Motor Scale   Medication On   R Brain DBS: None   L Brain DBS: None   Dyskinesia (LID) No   Speech 1   Facial Expression 1   Finger Taps R 0   Finger Taps L 0   Hand Mvt R 0   Hand Mvt L 0   Pron-/Supinate R 1   Pron-/Supinate L 1   Toe Tap R 0   Toe Tap L 0   Leg Agility R 0   Leg Agility L 0   Arise From Chair 0   Gait 0   Gait Freezing 0   Posture 0   Global Spont Mvt 1   Postural Tremor RUE 1   Postural Tremor LUE 0   Kinetic Tremor RUE 1   Kinetic Tremor LUE 1   Rest Tremor RUE 0   Rest Tremor LUE 0   Rest Tremor RLE 0   Rest Tremor LLE 0   Rest Tremor Lip/Jaw 0       Data Reviewed: I have personally reviewed the tests/studies below.       No results found for: \"A1C\"  No results found for: \"TSH\"  CBC RESULTS:   Recent Labs   Lab Test 05/03/22  1519   WBC 8.2   RBC 4.65   HGB 13.4   HCT 41.0   MCV 88   MCH 28.8   MCHC 32.7   RDW 12.5        Last Comprehensive Metabolic Panel:  Sodium   Date Value Ref Range Status   05/03/2022 141 136 - 145 mmol/L Final     Potassium   Date Value Ref Range Status   05/03/2022 3.9 3.5 - 5.0 mmol/L Final     Chloride   Date Value Ref Range Status   05/03/2022 108 (H) 98 - 107 mmol/L Final     Carbon Dioxide (CO2)   Date Value Ref Range Status   05/03/2022 28 22 - 31 mmol/L Final     Anion Gap   Date Value Ref Range Status   05/03/2022 5 5 - 18 mmol/L Final "     Glucose   Date Value Ref Range Status   05/03/2022 99 70 - 125 mg/dL Final     Urea Nitrogen   Date Value Ref Range Status   05/03/2022 11 8 - 28 mg/dL Final     Creatinine   Date Value Ref Range Status   05/03/2022 0.98 0.70 - 1.30 mg/dL Final     GFR Estimate   Date Value Ref Range Status   05/03/2022 82 >60 mL/min/1.73m2 Final     Comment:     Effective December 21, 2021 eGFRcr in adults is calculated using the 2021 CKD-EPI creatinine equation which includes age and gender (Baltazar et al., NEJ, DOI: 10.1056/BMFZpo5294632)     Calcium   Date Value Ref Range Status   05/03/2022 9.4 8.5 - 10.5 mg/dL Final     Bilirubin Total   Date Value Ref Range Status   05/03/2022 0.5 0.0 - 1.0 mg/dL Final     Alkaline Phosphatase   Date Value Ref Range Status   05/03/2022 79 45 - 120 U/L Final     ALT   Date Value Ref Range Status   05/03/2022 18 0 - 45 U/L Final     AST   Date Value Ref Range Status   05/03/2022 20 0 - 40 U/L Final

## 2024-03-06 NOTE — PATIENT INSTRUCTIONS
Plan:   - Start carbidopa/levodopa CR 50/200 mg 1 tab at bedtime   - No changes to carbidopa/levodopa IR   Movement Disorder-related Medications                   Indication  8-9 AM noon 330 PM  at bedtime      Carbidopa/levodopa IR 25/100 mg  PD 1.5 tabs 1.5 1.5       Carbidopa/levodopa CR 50/200 mg PD        1                    - Avoid protein 30 mins before or after taking carbidopa/levodopa IR. If you notice that Parkinson Disease symptoms are not as well controlled, go back to avoiding protein about an hour before or after taking carbidopa/levodopa IR.   - Stay hydrated with 6-8 glasses of 6-8 ounces of water daily. Stop drinking fluids 1 hour prior to bed.   - Continue regular audiology visits  - Provided Parkinson Disease exercise resources, see below   [facility] Fitness Classes and Support Group for People with Parkinson s Disease     Fitness Classes: Free fitness classes for people with Parkinson s disease.          When: Monday, Wednesday and Friday mornings          Where: Classes are a combination of virtual and in person in [place]          Details: Classes are led by a recreational therapist, who is certified in Parkinson s Wellness Recovery and has 15 years of PD experience. No doctor referral is needed. All classes include a warmup, cardio, strength training, stretching/flexibility work and Parkinson s-specific exercises during the 45-60 minutes.          Nordic Walking: For people with Parkinson s disease and their care providers.     When: Class is held 2 times per month, on Thursday mornings.     Where: Various locations in [facility] or [place].     Details: Class is led by a recreational therapist. Some Nordic walking poles are available for use. There is a $4.00 fee to enter the [employer] [facility].          Support Group: Free to people with Parkinson s disease and their care partners.     When: The first Wednesday of each month from 10 a.m. to 12:00pm.      Where: Option to meet in person in [place] or virtual     Details: The support group is educational, with a speaker on a specific topic and time for questions. The first hour, 10 a.m. to 11 a.m., is for people with PD and their care partners. From 11:00 a.m. to 12:00 p.m., care partners can connect with each other for conversation and support.        To sign up or for more information, please contact Carina Wheat, at [phone] or [email or web address].       Patient to return in 6 months, for in-person visit, 30 minutes.

## 2024-03-06 NOTE — LETTER
3/6/2024         RE: Bob Norton  [address]  [address]  [place] [address]        Dear Colleague,    Thank you for referring your patient, Bob Norton, to the [facility]. Please see a copy of my visit note below.    Department of Neurology  Movement Disorders Division     Patient: Bob Norton   MRN: [id number]   : 1950   Date of Visit: 2024    Impression:  Bob Norton is a 73 year old male with Parkinson Disease. The patient's main concern today is wearing off.    Parkinson Disease: Symptom started 2017 with tremors in hands, right hand. Then tremors progressed to left hand. Symptoms progressed to hypophonia, shuffling gait, masked facies, drooling. He was diagnosed with Parkinson Disease in 10/2022 and started on carbidopa/levodopa IR at this time with improvement in handwriting, fluidity and speed of gait, ability to walk farther distances, speaking louder.  Depression: Previously placed on a 48 hour hold, and while inpatient, medication adjustments were made to improve mood.     Plan:   - To improve wearing off, start carbidopa/levodopa CR 50/200 mg 1 tab at bedtime   - No changes to carbidopa/levodopa IR   Movement Disorder-related Medications                   Indication  8-9 AM noon 330 PM  at bedtime      Carbidopa/levodopa IR 25/100 mg  PD 1.5 tabs 1.5 1.5       Carbidopa/levodopa CR 50/200 mg PD        1                    - Avoid protein 30 mins before or after taking carbidopa/levodopa IR. If you notice that Parkinson Disease symptoms are not as well controlled, go back to avoiding protein about an hour before or after taking carbidopa/levodopa IR.   - Stay hydrated with 6-8 glasses of 6-8 ounces of water daily. Stop drinking fluids 1 hour prior to bed.   - Continue regular audiology visits  - Provided Parkinson Disease exercise resources     Patient to return in 6 months, for in-person visit, 30 minutes.      Santa Desouza,   Movement Disorders Specialist  [facility]     Note dictated using voice recognition software.   46 minutes spent on date of encounter doing chart reviews and exam and documentation and further activities as noted above.        ------------------------------------------------------------------------------------------------------------      History of Present Illness  Mr. Norton is a pleasant 73 year old male that presents to Neurology Movement clinic for follow up regarding Parkinson Disease management.   The patient was initially seen in neurologic consultation on 10/26/22 and most recently 9/2023 for management of Parkinson Disease.  At this visit, no changes were made to patient's carbidopa/levodopa IR medication regiment.  We discussed neuropsychiatric testing for which we decided to hold off at that time.  Regarding hypersomnolence, he was encouraged to discuss with Dr. Stewart to decrease the dose of Ativan for which she is taking.  He was encouraged to do daily and safe exercise.  We discussed the possibility of neurotoxin injections if sialorrhea remains an issue.  Please see the comprehensive neurologic consultation notes from those dates in the Epic records for details.      History obtained from patient. Patient is accompanied by daughter, Kimberley (ER physician).  Main complaint: wearing off  Patient reports working with Physical Therapy currently and has shown improvement.   Wearing off: denies  Balance/falls: He last injured himself around [facility] after getting stuck between a closing automatic door after turning around quickly. He suffered a cut on his forehead. Another time, he was reading for an hour and after getting up he felt lightheaded and took a few steps and fell on his rear. Drinks 2-3 bottles daily.   Sleep: Bed by 10 pm. He may have a hard time falling asleep. Sometimes it may be worrying about his ex-wife.   Takes maxzide and topapamax to  treat Meniere's disease.     Movement Disorder-related Medications                   8-9 am noon 330 pm       Carbidopa/levodopa IR 25/100 mg  1.5 1.5 1.5                                                    Review of Systems:  Other than that mentioned above, the remainder of 12 systems reviewed were negative.    PMH: unchanged  PSH: unchanged  FH: unchanged  SH: unchanged    Medications:  Current Outpatient Medications   Medication Sig Dispense Refill     acetaminophen (TYLENOL) 500 MG tablet Take 1,000 mg by mouth At Bedtime       alfuzosin ER (UROXATRAL) 10 MG 24 hr tablet Take 10 mg by mouth daily       atorvastatin (LIPITOR) 40 MG tablet Take 40 mg by mouth daily       carbidopa-levodopa (SINEMET)  MG tablet Take 1.5 tablets by mouth 3 times daily Week 1 take 0.5 tabs three times daily. Week 2 take 1 tab three times daily. Stop at lowest effective dose. Take medication an hour before or after a protein rich brenda. (Patient taking differently: Take 1.5 tablets by mouth 3 times daily Take medication an hour before or after a protein rich brenda.) 405 tablet 3     citalopram (CELEXA) 20 MG tablet Take 20 mg by mouth daily       famotidine (PEPCID) 20 MG tablet Take 20 mg by mouth 2 times daily       gabapentin (NEURONTIN) 300 MG capsule Take 1 capsule by mouth At Bedtime       hydrochlorothiazide (HYDRODIURIL) 25 MG tablet Take 25 mg by mouth daily as needed       LORazepam (ATIVAN) 0.5 MG tablet TAKE 1/2 TO 1 TABLET BY MOUTH EVERY 8 HOURS PRN FOR ANXIETY       meclizine (ANTIVERT) 25 MG tablet Take 25 mg by mouth 2 times daily as needed       potassium chloride ER (K-TAB) 20 MEQ CR tablet Take 20 mEq by mouth 2 times daily       topiramate (TOPAMAX) 25 MG tablet Take 25 mg by mouth 2 times daily       triamterene-HCTZ (MAXZIDE) 75-50 MG tablet Take 0.5 tablets by mouth       warfarin ANTICOAGULANT (COUMADIN) 5 MG tablet Take by mouth 1 tab (5mg) on Mon, Wed, Fri and 0.5 tab (2.5mg) all other days OR as directed  "by INR nurse.             Allergies   Allergen Reactions     Metronidazole Hives and Rash     Other reaction(s): SKIN - rash  Was taking this medication at the same time as sulfameth/trimethoprim, had a reaction when he finished both prescriptions.       Sulfamethoxazole-Trimethoprim Rash and Hives     Was taking this with metronidazole, and when finished with the prescription, broke out in hives.     Anesthetics, Halogenated      Other reaction(s): GENERAL - sleep disorders     Codeine Nausea and Vomiting     Hydrocodone-Acetaminophen Nausea and Vomiting and Nausea     Other reaction(s): Intolerance  Vomiting       Lisinopril Cough     Morphine Nausea and Vomiting     Venlafaxine      Other reaction(s): OTHER (explain in comments), UNKNOWN  CNS  CNS            Physical Exam:  The patient's  blood pressure is 123/67 and his pulse is 62.        3/6/2024     2:00 PM   UPDRS Motor Scale   Medication On   R Brain DBS: None   L Brain DBS: None   Dyskinesia (LID) No   Speech 1   Facial Expression 1   Finger Taps R 0   Finger Taps L 0   Hand Mvt R 0   Hand Mvt L 0   Pron-/Supinate R 1   Pron-/Supinate L 1   Toe Tap R 0   Toe Tap L 0   Leg Agility R 0   Leg Agility L 0   Arise From Chair 0   Gait 0   Gait Freezing 0   Posture 0   Global Spont Mvt 1   Postural Tremor RUE 1   Postural Tremor LUE 0   Kinetic Tremor RUE 1   Kinetic Tremor LUE 1   Rest Tremor RUE 0   Rest Tremor LUE 0   Rest Tremor RLE 0   Rest Tremor LLE 0   Rest Tremor Lip/Jaw 0       Data Reviewed: I have personally reviewed the tests/studies below.       No results found for: \"A1C\"  No results found for: \"TSH\"  CBC RESULTS:   Recent Labs   Lab Test 05/03/22  1519   WBC 8.2   RBC 4.65   HGB 13.4   HCT 41.0   MCV 88   MCH 28.8   MCHC 32.7   RDW 12.5        Last Comprehensive Metabolic Panel:  Sodium   Date Value Ref Range Status   05/03/2022 141 136 - 145 mmol/L Final     Potassium   Date Value Ref Range Status   05/03/2022 3.9 3.5 - 5.0 mmol/L Final "     Chloride   Date Value Ref Range Status   05/03/2022 108 (H) 98 - 107 mmol/L Final     Carbon Dioxide (CO2)   Date Value Ref Range Status   05/03/2022 28 22 - 31 mmol/L Final     Anion Gap   Date Value Ref Range Status   05/03/2022 5 5 - 18 mmol/L Final     Glucose   Date Value Ref Range Status   05/03/2022 99 70 - 125 mg/dL Final     Urea Nitrogen   Date Value Ref Range Status   05/03/2022 11 8 - 28 mg/dL Final     Creatinine   Date Value Ref Range Status   05/03/2022 0.98 0.70 - 1.30 mg/dL Final     GFR Estimate   Date Value Ref Range Status   05/03/2022 82 >60 mL/min/1.73m2 Final     Comment:     Effective December 21, 2021 eGFRcr in adults is calculated using the 2021 CKD-EPI creatinine equation which includes age and gender (Baltazar et al., NEJM, DOI: 10.1056/ZAVAub3403042)     Calcium   Date Value Ref Range Status   05/03/2022 9.4 8.5 - 10.5 mg/dL Final     Bilirubin Total   Date Value Ref Range Status   05/03/2022 0.5 0.0 - 1.0 mg/dL Final     Alkaline Phosphatase   Date Value Ref Range Status   05/03/2022 79 45 - 120 U/L Final     ALT   Date Value Ref Range Status   05/03/2022 18 0 - 45 U/L Final     AST   Date Value Ref Range Status   05/03/2022 20 0 - 40 U/L Final                        Again, thank you for allowing me to participate in the care of your patient.        Sincerely,        Santa Desozua DO

## 2024-03-15 ENCOUNTER — TELEPHONE (OUTPATIENT)
Dept: NEUROLOGY | Facility: CLINIC | Age: 74
End: 2024-03-15
Payer: MEDICARE

## 2024-03-15 DIAGNOSIS — G20.A1 PARKINSON DISEASE (H): ICD-10-CM

## 2024-03-15 NOTE — TELEPHONE ENCOUNTER
[facility] Call Center    Phone Message    May a detailed message be left on voicemail: yes     Reason for Call:     Patient called would like to speak to care team, per pt his pharmacy has not heard bacl from clinic regarding carbidopa-levodopa (SINEMET)  MG tablet and carbidopa-levodopa CR  MG. Please send prescription to [facility] "[facility] #[id number] - [place], [place] - [address] AT [facility] [address]      Action Taken: Other: [facility] neurology    Travel Screening: Not Applicable

## 2024-03-18 RX ORDER — CARBIDOPA AND LEVODOPA 25; 100 MG/1; MG/1
1.5 TABLET ORAL 3 TIMES DAILY
Qty: 405 TABLET | Refills: 3 | Status: SHIPPED | OUTPATIENT
Start: 2024-03-18

## 2024-03-18 RX ORDER — CARBIDOPA AND LEVODOPA 50; 200 MG/1; MG/1
1 TABLET, EXTENDED RELEASE ORAL AT BEDTIME
Qty: 90 TABLET | Refills: 3 | Status: SHIPPED | OUTPATIENT
Start: 2024-03-18

## 2024-03-18 NOTE — TELEPHONE ENCOUNTER
LOV: 3/6/24    NOV: 9/11/2024    Per LOV note:   Plan:   - To improve wearing off, start carbidopa/levodopa CR 50/200 mg 1 tab at bedtime   - No changes to carbidopa/levodopa IR   Movement Disorder-related Medications                   Indication  8-9 AM noon 330 PM  at bedtime      Carbidopa/levodopa IR 25/100 mg  PD 1.5 tabs 1.5 1.5       Carbidopa/levodopa CR 50/200 mg PD        1                           Refill:   Signed Prescriptions:                        Disp   Refills    carbidopa-levodopa (SINEMET)  MG tab*405 ta*3        Sig: Take 1.5 tablets by mouth 3 times daily Take           medication an hour before or after a protein rich           brenda.  Authorizing Provider: JIMMY SOSA  Ordering User: JOSE PARMAR    carbidopa-levodopa (SINEMET CR)  MG *90 tab*3        Sig: Take 1 tablet by mouth at bedtime  Authorizing Provider: JIMMY SOSA  Ordering User: JOSE PARMAR    Rx's sent as requested by patient.    Jose Parmar, RN, BSN  [facility]

## 2024-10-06 ENCOUNTER — HEALTH MAINTENANCE LETTER (OUTPATIENT)
Age: 74
End: 2024-10-06

## 2024-12-17 DIAGNOSIS — G20.A1 PARKINSON DISEASE (H): ICD-10-CM

## 2024-12-18 NOTE — TELEPHONE ENCOUNTER
Pending Prescriptions:                       Disp   Refills    carbidopa-levodopa (SINEMET)  MG ta*405 ta*3            Sig: Take 1.5 tablets by mouth 3 times daily. Take           medication an hour before or after a protein rich           meal.    Future Appointments    Encounter Information   Provider Department Center   2/21/2025 7:00 AM (Arrive by 6:45 AM) Santa Desouza DO [facility]       Medication T'd for review and signature      SHAWN Parish on 12/18/2024 at 8:11 AM

## 2024-12-19 RX ORDER — CARBIDOPA AND LEVODOPA 25; 100 MG/1; MG/1
1.5 TABLET ORAL 3 TIMES DAILY
Qty: 405 TABLET | Refills: 3 | Status: SHIPPED | OUTPATIENT
Start: 2024-12-19

## 2025-04-06 ENCOUNTER — HEALTH MAINTENANCE LETTER (OUTPATIENT)
Age: 75
End: 2025-04-06

## 2025-04-18 PROBLEM — T50.905A DRUG-INDUCED HYPOKALEMIA: Status: ACTIVE | Noted: 2023-08-28

## 2025-04-18 PROBLEM — R41.3 MEMORY DEFICIT: Status: ACTIVE | Noted: 2023-05-24

## 2025-04-18 PROBLEM — G89.29 CHRONIC PAIN OF RIGHT KNEE: Status: ACTIVE | Noted: 2019-07-22

## 2025-04-18 PROBLEM — E53.8 VITAMIN B12 DEFICIENCY: Status: ACTIVE | Noted: 2023-01-12

## 2025-04-18 PROBLEM — F32.1 MAJOR DEPRESSIVE DISORDER, SINGLE EPISODE, MODERATE (H): Status: ACTIVE | Noted: 2025-04-18

## 2025-04-18 PROBLEM — F43.20 ADJUSTMENT REACTION: Status: ACTIVE | Noted: 2023-01-11

## 2025-04-18 PROBLEM — R29.898 RIGHT ARM WEAKNESS: Status: ACTIVE | Noted: 2019-02-07

## 2025-04-18 PROBLEM — M25.561 CHRONIC PAIN OF RIGHT KNEE: Status: ACTIVE | Noted: 2019-07-22

## 2025-04-18 PROBLEM — E87.6 DRUG-INDUCED HYPOKALEMIA: Status: ACTIVE | Noted: 2023-08-28

## 2025-04-18 PROBLEM — Z86.711 HISTORY OF PULMONARY EMBOLISM: Status: ACTIVE | Noted: 2019-02-07

## 2025-04-18 PROBLEM — N40.1 BENIGN PROSTATIC HYPERPLASIA WITH URINARY FREQUENCY: Status: ACTIVE | Noted: 2019-02-07

## 2025-04-18 PROBLEM — F33.41 RECURRENT MAJOR DEPRESSIVE DISORDER, IN PARTIAL REMISSION: Status: ACTIVE | Noted: 2025-02-10

## 2025-04-18 PROBLEM — E53.1 VITAMIN B6 DEFICIENCY: Status: ACTIVE | Noted: 2023-01-12

## 2025-04-18 PROBLEM — G47.33 OSA (OBSTRUCTIVE SLEEP APNEA): Status: ACTIVE | Noted: 2019-02-07

## 2025-04-18 PROBLEM — G20.A1 PARKINSON DISEASE (H): Status: ACTIVE | Noted: 2022-05-27

## 2025-04-18 PROBLEM — G56.20 ULNAR NEUROPATHY: Status: ACTIVE | Noted: 2018-05-02

## 2025-04-18 PROBLEM — G44.229 CHRONIC TENSION HEADACHE: Status: ACTIVE | Noted: 2019-02-07

## 2025-04-18 PROBLEM — K22.70 BARRETT'S ESOPHAGUS WITHOUT DYSPLASIA: Status: ACTIVE | Noted: 2019-02-07

## 2025-04-18 PROBLEM — H91.93 BILATERAL HEARING LOSS: Status: ACTIVE | Noted: 2025-04-18

## 2025-04-18 PROBLEM — R35.0 BENIGN PROSTATIC HYPERPLASIA WITH URINARY FREQUENCY: Status: ACTIVE | Noted: 2019-02-07

## 2025-04-18 PROBLEM — L93.0 DLE (DISCOID LUPUS ERYTHEMATOSUS): Status: ACTIVE | Noted: 2020-07-27

## 2025-04-18 PROBLEM — Z86.718 HISTORY OF DVT OF LOWER EXTREMITY: Status: ACTIVE | Noted: 2019-02-07

## 2025-04-21 ENCOUNTER — TELEPHONE (OUTPATIENT)
Dept: FAMILY MEDICINE | Facility: CLINIC | Age: 75
End: 2025-04-21
Payer: MEDICARE

## 2025-04-21 NOTE — TELEPHONE ENCOUNTER
PT comprehensive sleep study order was faxed to [facility] in [place] for scheduling 4/18.  Pt was informed during office visit how scheduling was be performed.  This is all in pt 4/18 visit note.  Saulo Colbert CMA

## 2025-04-21 NOTE — TELEPHONE ENCOUNTER
Reason for Call:  Appointment Request    Patient requesting this type of appt:  sleep study     Requested provider:  [facility]     Reason patient unable to be scheduled: Needs to be scheduled by clinic    When does patient want to be seen/preferred time:  any     Comments: central scheduling does not schedule [facility] sleep study appointments     Could we send this information to you in [place] or would you prefer to receive a phone call?:   Patient would prefer a phone call   Okay to leave a detailed message?: Yes at Home number on file [phone] (home)    Call taken on 4/21/2025 at 11:18 AM by Marion Das